# Patient Record
Sex: FEMALE | Race: ASIAN | NOT HISPANIC OR LATINO
[De-identification: names, ages, dates, MRNs, and addresses within clinical notes are randomized per-mention and may not be internally consistent; named-entity substitution may affect disease eponyms.]

---

## 2019-07-24 PROBLEM — Z00.00 ENCOUNTER FOR PREVENTIVE HEALTH EXAMINATION: Status: ACTIVE | Noted: 2019-07-24

## 2019-09-04 ENCOUNTER — APPOINTMENT (OUTPATIENT)
Dept: GASTROENTEROLOGY | Facility: CLINIC | Age: 24
End: 2019-09-04
Payer: COMMERCIAL

## 2019-09-04 VITALS
WEIGHT: 224 LBS | BODY MASS INDEX: 45.16 KG/M2 | SYSTOLIC BLOOD PRESSURE: 122 MMHG | HEIGHT: 59 IN | TEMPERATURE: 98.4 F | HEART RATE: 60 BPM | RESPIRATION RATE: 16 BRPM | OXYGEN SATURATION: 98 % | DIASTOLIC BLOOD PRESSURE: 78 MMHG

## 2019-09-04 DIAGNOSIS — R10.32 LEFT LOWER QUADRANT PAIN: ICD-10-CM

## 2019-09-04 DIAGNOSIS — K21.9 GASTRO-ESOPHAGEAL REFLUX DISEASE W/OUT ESOPHAGITIS: ICD-10-CM

## 2019-09-04 DIAGNOSIS — Z78.9 OTHER SPECIFIED HEALTH STATUS: ICD-10-CM

## 2019-09-04 DIAGNOSIS — Z84.1 FAMILY HISTORY OF DISORDERS OF KIDNEY AND URETER: ICD-10-CM

## 2019-09-04 DIAGNOSIS — E66.01 MORBID (SEVERE) OBESITY DUE TO EXCESS CALORIES: ICD-10-CM

## 2019-09-04 DIAGNOSIS — E28.2 POLYCYSTIC OVARIAN SYNDROME: ICD-10-CM

## 2019-09-04 PROCEDURE — 99204 OFFICE O/P NEW MOD 45 MIN: CPT

## 2019-09-04 RX ORDER — OMEPRAZOLE 40 MG/1
40 CAPSULE, DELAYED RELEASE ORAL
Qty: 1 | Refills: 3 | Status: ACTIVE | COMMUNITY
Start: 2019-09-04 | End: 1900-01-01

## 2019-09-04 RX ORDER — LINACLOTIDE 145 UG/1
145 CAPSULE, GELATIN COATED ORAL
Qty: 30 | Refills: 3 | Status: ACTIVE | COMMUNITY
Start: 2019-09-04 | End: 1900-01-01

## 2019-09-04 NOTE — PHYSICAL EXAM
[General Appearance - Alert] : alert [General Appearance - In No Acute Distress] : in no acute distress [General Appearance - Well Nourished] : well nourished [General Appearance - Well Developed] : well developed [Auscultation Breath Sounds / Voice Sounds] : lungs were clear to auscultation bilaterally [Heart Sounds] : normal S1 and S2 [Bowel Sounds] : normal bowel sounds [Abdomen Soft] : soft [Abdomen Tenderness] : non-tender [Abnormal Walk] : normal gait [Oriented To Time, Place, And Person] : oriented to person, place, and time [FreeTextEntry1] : obese [Skin Lesions] : no skin lesions [] : no rash

## 2019-09-04 NOTE — REVIEW OF SYSTEMS
[Fever] : no fever [Chills] : no chills [Eye Pain] : no eye pain [Red Eyes] : eyes not red [Chest Pain] : no chest pain [Palpitations] : no palpitations [Arthralgias] : no arthralgias [Dysuria] : no dysuria [Joint Pain] : no joint pain [Anxiety] : no anxiety [Depression] : no depression [Muscle Weakness] : no muscle weakness [Easy Bleeding] : no tendency for easy bleeding [Easy Bruising] : no tendency for easy bruising

## 2019-09-04 NOTE — ASSESSMENT
[FreeTextEntry1] : 1. Vague llq pain infrequent, with c/o incomplete evacuation of stools, ? IBS with constipation\par \par plan trial of linzess\par \par 2. GERD not weekly, no weight loss, no dysphagia\par \par plan;antireflux diet\par           ppi as needed\par \par 3. morbid obesity\par \par plan nutrition consult\par          exercise for weight loss.

## 2019-12-13 ENCOUNTER — APPOINTMENT (OUTPATIENT)
Dept: GASTROENTEROLOGY | Facility: CLINIC | Age: 24
End: 2019-12-13

## 2021-08-05 ENCOUNTER — NON-APPOINTMENT (OUTPATIENT)
Age: 26
End: 2021-08-05

## 2021-08-05 ENCOUNTER — APPOINTMENT (OUTPATIENT)
Dept: MATERNAL FETAL MEDICINE | Facility: CLINIC | Age: 26
End: 2021-08-05

## 2021-08-23 ENCOUNTER — APPOINTMENT (OUTPATIENT)
Dept: MATERNAL FETAL MEDICINE | Facility: CLINIC | Age: 26
End: 2021-08-23

## 2021-08-23 ENCOUNTER — TRANSCRIPTION ENCOUNTER (OUTPATIENT)
Age: 26
End: 2021-08-23

## 2021-08-23 ENCOUNTER — NON-APPOINTMENT (OUTPATIENT)
Age: 26
End: 2021-08-23

## 2021-10-08 ENCOUNTER — APPOINTMENT (OUTPATIENT)
Dept: ANTEPARTUM | Facility: CLINIC | Age: 26
End: 2021-10-08
Payer: COMMERCIAL

## 2021-10-08 ENCOUNTER — ASOB RESULT (OUTPATIENT)
Age: 26
End: 2021-10-08

## 2021-10-08 PROCEDURE — 76811 OB US DETAILED SNGL FETUS: CPT

## 2021-10-18 ENCOUNTER — ASOB RESULT (OUTPATIENT)
Age: 26
End: 2021-10-18

## 2021-10-18 ENCOUNTER — APPOINTMENT (OUTPATIENT)
Dept: ANTEPARTUM | Facility: CLINIC | Age: 26
End: 2021-10-18
Payer: COMMERCIAL

## 2021-10-18 PROCEDURE — 76816 OB US FOLLOW-UP PER FETUS: CPT

## 2022-02-24 ENCOUNTER — INPATIENT (INPATIENT)
Facility: HOSPITAL | Age: 27
LOS: 1 days | Discharge: HOME | End: 2022-02-26
Attending: OBSTETRICS & GYNECOLOGY | Admitting: OBSTETRICS & GYNECOLOGY
Payer: COMMERCIAL

## 2022-02-24 VITALS
SYSTOLIC BLOOD PRESSURE: 115 MMHG | HEART RATE: 70 BPM | RESPIRATION RATE: 17 BRPM | OXYGEN SATURATION: 99 % | TEMPERATURE: 98 F | DIASTOLIC BLOOD PRESSURE: 73 MMHG

## 2022-02-24 DIAGNOSIS — O26.899 OTHER SPECIFIED PREGNANCY RELATED CONDITIONS, UNSPECIFIED TRIMESTER: ICD-10-CM

## 2022-02-24 DIAGNOSIS — Z98.84 BARIATRIC SURGERY STATUS: Chronic | ICD-10-CM

## 2022-02-24 DIAGNOSIS — Z3A.00 WEEKS OF GESTATION OF PREGNANCY NOT SPECIFIED: ICD-10-CM

## 2022-02-24 DIAGNOSIS — Z34.80 ENCOUNTER FOR SUPERVISION OF OTHER NORMAL PREGNANCY, UNSPECIFIED TRIMESTER: ICD-10-CM

## 2022-02-24 LAB
BASOPHILS # BLD AUTO: 0.04 K/UL — SIGNIFICANT CHANGE UP (ref 0–0.2)
BASOPHILS NFR BLD AUTO: 0.4 % — SIGNIFICANT CHANGE UP (ref 0–2)
BLD GP AB SCN SERPL QL: NEGATIVE — SIGNIFICANT CHANGE UP
COVID-19 SPIKE DOMAIN AB INTERP: POSITIVE
COVID-19 SPIKE DOMAIN ANTIBODY RESULT: >250 U/ML — HIGH
EOSINOPHIL # BLD AUTO: 0.04 K/UL — SIGNIFICANT CHANGE UP (ref 0–0.5)
EOSINOPHIL NFR BLD AUTO: 0.4 % — SIGNIFICANT CHANGE UP (ref 0–6)
HCT VFR BLD CALC: 27.4 % — LOW (ref 34.5–45)
HCT VFR BLD CALC: 35.8 % — SIGNIFICANT CHANGE UP (ref 34.5–45)
HGB BLD-MCNC: 10.9 G/DL — LOW (ref 11.5–15.5)
HGB BLD-MCNC: 8.6 G/DL — LOW (ref 11.5–15.5)
IMM GRANULOCYTES NFR BLD AUTO: 0.3 % — SIGNIFICANT CHANGE UP (ref 0–1.5)
LYMPHOCYTES # BLD AUTO: 2.54 K/UL — SIGNIFICANT CHANGE UP (ref 1–3.3)
LYMPHOCYTES # BLD AUTO: 26.1 % — SIGNIFICANT CHANGE UP (ref 13–44)
MCHC RBC-ENTMCNC: 26.8 PG — LOW (ref 27–34)
MCHC RBC-ENTMCNC: 27.9 PG — SIGNIFICANT CHANGE UP (ref 27–34)
MCHC RBC-ENTMCNC: 30.4 GM/DL — LOW (ref 32–36)
MCHC RBC-ENTMCNC: 31.4 GM/DL — LOW (ref 32–36)
MCV RBC AUTO: 88 FL — SIGNIFICANT CHANGE UP (ref 80–100)
MCV RBC AUTO: 89 FL — SIGNIFICANT CHANGE UP (ref 80–100)
MONOCYTES # BLD AUTO: 0.61 K/UL — SIGNIFICANT CHANGE UP (ref 0–0.9)
MONOCYTES NFR BLD AUTO: 6.3 % — SIGNIFICANT CHANGE UP (ref 2–14)
NEUTROPHILS # BLD AUTO: 6.47 K/UL — SIGNIFICANT CHANGE UP (ref 1.8–7.4)
NEUTROPHILS NFR BLD AUTO: 66.5 % — SIGNIFICANT CHANGE UP (ref 43–77)
NRBC # BLD: 0 /100 WBCS — SIGNIFICANT CHANGE UP (ref 0–0)
NRBC # BLD: 0 /100 WBCS — SIGNIFICANT CHANGE UP (ref 0–0)
PLATELET # BLD AUTO: 218 K/UL — SIGNIFICANT CHANGE UP (ref 150–400)
PLATELET # BLD AUTO: 221 K/UL — SIGNIFICANT CHANGE UP (ref 150–400)
RBC # BLD: 3.08 M/UL — LOW (ref 3.8–5.2)
RBC # BLD: 4.07 M/UL — SIGNIFICANT CHANGE UP (ref 3.8–5.2)
RBC # FLD: 13.2 % — SIGNIFICANT CHANGE UP (ref 10.3–14.5)
RBC # FLD: 13.3 % — SIGNIFICANT CHANGE UP (ref 10.3–14.5)
RH IG SCN BLD-IMP: POSITIVE — SIGNIFICANT CHANGE UP
RH IG SCN BLD-IMP: POSITIVE — SIGNIFICANT CHANGE UP
SARS-COV-2 IGG+IGM SERPL QL IA: >250 U/ML — HIGH
SARS-COV-2 IGG+IGM SERPL QL IA: POSITIVE
SARS-COV-2 RNA SPEC QL NAA+PROBE: SIGNIFICANT CHANGE UP
WBC # BLD: 15.23 K/UL — HIGH (ref 3.8–10.5)
WBC # BLD: 9.73 K/UL — SIGNIFICANT CHANGE UP (ref 3.8–10.5)
WBC # FLD AUTO: 15.23 K/UL — HIGH (ref 3.8–10.5)
WBC # FLD AUTO: 9.73 K/UL — SIGNIFICANT CHANGE UP (ref 3.8–10.5)

## 2022-02-24 PROCEDURE — 93010 ELECTROCARDIOGRAM REPORT: CPT

## 2022-02-24 RX ORDER — OXYCODONE HYDROCHLORIDE 5 MG/1
5 TABLET ORAL
Refills: 0 | Status: DISCONTINUED | OUTPATIENT
Start: 2022-02-24 | End: 2022-02-26

## 2022-02-24 RX ORDER — SODIUM CHLORIDE 9 MG/ML
1000 INJECTION, SOLUTION INTRAVENOUS
Refills: 0 | Status: DISCONTINUED | OUTPATIENT
Start: 2022-02-24 | End: 2022-02-25

## 2022-02-24 RX ORDER — ONDANSETRON 8 MG/1
4 TABLET, FILM COATED ORAL ONCE
Refills: 0 | Status: DISCONTINUED | OUTPATIENT
Start: 2022-02-24 | End: 2022-02-26

## 2022-02-24 RX ORDER — SODIUM CHLORIDE 9 MG/ML
300 INJECTION INTRAMUSCULAR; INTRAVENOUS; SUBCUTANEOUS ONCE
Refills: 0 | Status: DISCONTINUED | OUTPATIENT
Start: 2022-02-24 | End: 2022-02-26

## 2022-02-24 RX ORDER — KETOROLAC TROMETHAMINE 30 MG/ML
30 SYRINGE (ML) INJECTION ONCE
Refills: 0 | Status: DISCONTINUED | OUTPATIENT
Start: 2022-02-24 | End: 2022-02-24

## 2022-02-24 RX ORDER — CITRIC ACID/SODIUM CITRATE 300-500 MG
15 SOLUTION, ORAL ORAL EVERY 6 HOURS
Refills: 0 | Status: DISCONTINUED | OUTPATIENT
Start: 2022-02-24 | End: 2022-02-25

## 2022-02-24 RX ORDER — SODIUM CHLORIDE 9 MG/ML
1000 INJECTION INTRAMUSCULAR; INTRAVENOUS; SUBCUTANEOUS
Refills: 0 | Status: DISCONTINUED | OUTPATIENT
Start: 2022-02-24 | End: 2022-02-26

## 2022-02-24 RX ORDER — SODIUM CHLORIDE 9 MG/ML
1000 INJECTION, SOLUTION INTRAVENOUS ONCE
Refills: 0 | Status: COMPLETED | OUTPATIENT
Start: 2022-02-24 | End: 2022-02-24

## 2022-02-24 RX ORDER — OXYCODONE HYDROCHLORIDE 5 MG/1
5 TABLET ORAL ONCE
Refills: 0 | Status: DISCONTINUED | OUTPATIENT
Start: 2022-02-24 | End: 2022-02-26

## 2022-02-24 RX ORDER — ACETAMINOPHEN 500 MG
975 TABLET ORAL
Refills: 0 | Status: DISCONTINUED | OUTPATIENT
Start: 2022-02-24 | End: 2022-02-26

## 2022-02-24 RX ORDER — OXYTOCIN 10 UNIT/ML
4 VIAL (ML) INJECTION
Qty: 30 | Refills: 0 | Status: DISCONTINUED | OUTPATIENT
Start: 2022-02-24 | End: 2022-02-25

## 2022-02-24 RX ORDER — CEFAZOLIN SODIUM 1 G
2000 VIAL (EA) INJECTION ONCE
Refills: 0 | Status: COMPLETED | OUTPATIENT
Start: 2022-02-24 | End: 2022-02-24

## 2022-02-24 RX ORDER — OXYTOCIN 10 UNIT/ML
333.33 VIAL (ML) INJECTION
Qty: 20 | Refills: 0 | Status: COMPLETED | OUTPATIENT
Start: 2022-02-24 | End: 2022-02-24

## 2022-02-24 RX ORDER — IBUPROFEN 200 MG
600 TABLET ORAL EVERY 6 HOURS
Refills: 0 | Status: COMPLETED | OUTPATIENT
Start: 2022-02-24 | End: 2023-01-23

## 2022-02-24 RX ADMIN — Medication 100 MILLIGRAM(S): at 22:04

## 2022-02-24 RX ADMIN — Medication 1000 MILLIUNIT(S)/MIN: at 20:32

## 2022-02-24 RX ADMIN — Medication 30 MILLIGRAM(S): at 23:04

## 2022-02-24 RX ADMIN — Medication 4 MILLIUNIT(S)/MIN: at 12:20

## 2022-02-24 RX ADMIN — SODIUM CHLORIDE 1000 MILLILITER(S): 9 INJECTION, SOLUTION INTRAVENOUS at 22:04

## 2022-02-24 NOTE — OB RN PATIENT PROFILE - NS_DATEOFLASTVISIT_OBGYN_ALL_OB_DT
Therapist assisted pt in creating a Safety Plan. Pt able to identify adaptive coping skills and distracting activities to help keep self safe post discharge. Plan also includes supportive resources. Safety plan on AVS-see AVS for details.  Also, conducted motivational interview and completed AODA Recovery plan with pt. See AVS for details. Daisy Canales LCSW, SAC   23-Feb-2022

## 2022-02-24 NOTE — OB RN PATIENT PROFILE - FALL HARM RISK - RISK INTERVENTIONS

## 2022-02-24 NOTE — OB RN DELIVERY SUMMARY - NS_SEPSISRSKCALC_OBGYN_ALL_OB_FT
EOS calculated successfully. EOS Risk Factor: 0.5/1000 live births ([facility] national incidence); GA=39w3d; Temp=98.24; ROM=4.9; GBS='Negative'; Antibiotics='No antibiotics or any antibiotics < 2 hrs prior to birth'

## 2022-02-24 NOTE — OB PROVIDER DELIVERY SUMMARY - NSSELHIDDEN_OBGYN_ALL_OB_FT
[NS_DeliveryAttending1_OBGYN_ALL_OB_FT:[id number]=],[NS_DeliveryRN_OBGYN_ALL_OB_FT:[id number]=]

## 2022-02-24 NOTE — PROVIDER CONTACT NOTE (OTHER) - BACKGROUND
27 year old female  39w 3d s/p  @ 20:16 EBL 600cc s/p IM Pitocin, TN Cytotec, IM Hemabate, TXA, and lomotil

## 2022-02-24 NOTE — OB RN DELIVERY SUMMARY - NSSELHIDDEN_OBGYN_ALL_OB_FT
[NS_DeliveryAttending1_OBGYN_ALL_OB_FT:[id number]=],[NS_DeliveryRN_OBGYN_ALL_OB_FT:[id number]=]

## 2022-02-24 NOTE — OB PROVIDER H&P - ASSESSMENT
26 yo P0 at 39w3d admitted in early labor. GBS neg    Plan  1. Admit to LND. Routine Labs. IVF.  2. IOL w pit  3. Fetus: cat 1 tracing. VTX. EFW 3200g by sono. Continuous EFM. Sono. No concerns.  4. Prenatal issues: none  5. GBS neg  6. Pain: IV pain meds/epidural PRN    Patient discussed with attending physician, Dr. Sanon.    Jen Ramirez MD PGY1

## 2022-02-24 NOTE — PROVIDER CONTACT NOTE (OTHER) - ASSESSMENT
pt with  bpm upon entering L&D PACU s/p . pt asymptomatic denies any palpitations, chest pain, dizziness, etc. BOBBY Yoon notified.

## 2022-02-24 NOTE — OB PROVIDER DELIVERY SUMMARY - NSPROVIDERDELIVERYNOTE_OBGYN_ALL_OB_FT
Spontaneous vaginal delivery of liveborn infant girl from OA position  Nuchal x 2  APGARS 8/9  Delayed cord clamping performed  Cord gases collected  Placenta delivered intact  Fundus atonic -- given IM pitocin, cytotec, hemabate, TXA  Tone improved  Intact cervix, vaginal walls and sulci  2nd deg lac repaired with 2.0 vicryl rapide  Excellent hemostasis  Count correct x 2    radha stanton

## 2022-02-24 NOTE — OB PROVIDER H&P - HISTORY OF PRESENT ILLNESS
Admission H&P    Subjective  HPI: 27 yoF  at 39w3d gestational age presents for contractions q4-5  mins. States that contractions started early thsi morning and were q4-5 mins but have spaced out since coming into triage. +FM. -LOF. -VB. Pt denies any other concerns.    – PNC: Denies prenatal issues. GBS neg.  EFW 3200g by ivonne.  – OBHx: P0  – GynHx: denies  – PMH: denies  – PSH: Gastric sleeve in   – Psych: denies   – Social: denies   – Meds: PNV   – Allergies: NKDA  – Will accept blood transfusions? Yes

## 2022-02-24 NOTE — PRE-ANESTHESIA EVALUATION ADULT - NSANTHOSAYNRD_GEN_A_CORE
No. LUIS ALFREDO screening performed.  STOP BANG Legend: 0-2 = LOW Risk; 3-4 = INTERMEDIATE Risk; 5-8 = HIGH Risk

## 2022-02-24 NOTE — OB PROVIDER LABOR PROGRESS NOTE - NS_SUBJECTIVE/OBJECTIVE_OBGYN_ALL_OB_FT
Pt examined for progress  Just received epidural
Pt examined for late decels
Pt examined for progress
pt examined for progress

## 2022-02-24 NOTE — OB PROVIDER LABOR PROGRESS NOTE - ASSESSMENT
IUPC placed  will do amnioinfusion  Pitocin currently at 2 because of cat 2 tracing -- not adequate contractions  Discussed that she has made minimal change over 6 hours and now cat 2 tracing. Discussed possibility of     radha stanton
labor down  start pushing in 30 min    radha stanton
start pitocin  Plan for AROM  peanut ball  epidural PRN    radha stanton
AROM clear  continue pitocin  peanut ball    radha stanton

## 2022-02-24 NOTE — OB PROVIDER H&P - NSHPPHYSICALEXAM_GEN_ALL_CORE
Objective  – Vital Signs  Vital Signs Last 24 Hrs  T(C): 36.6 (24 Feb 2022 08:32), Max: 36.6 (24 Feb 2022 08:32)  T(F): 97.9 (24 Feb 2022 08:32), Max: 97.9 (24 Feb 2022 08:32)  HR: 68 (24 Feb 2022 09:22) (67 - 118)  BP: 115/73 (24 Feb 2022 08:41) (115/73 - 115/73)  BP(mean): --  RR: 17 (24 Feb 2022 08:32) (17 - 17)  SpO2: 99% (24 Feb 2022 09:22) (91% - 100%)  – PE:   CV: RRR  Pulm: breathing comfortably on RA  Abd: gravid, nontender  Extr: moving all extremities with ease  – VE: 3.5/80/-3  – FHT: baseline 135, mod variability, +accels, -decels  – [place]: irregular  – EFW: 3200g by sono  – Sono: vertex

## 2022-02-24 NOTE — PROVIDER CONTACT NOTE (OTHER) - ACTION/TREATMENT ORDERED:
as per BOBBY Yoon obtain EKG, draw CBC, 1L IV fluid bolus of LR, and IVPB ancef 2g x1 dose as ordered. continue to monitor.

## 2022-02-25 LAB
HCT VFR BLD CALC: 22 % — LOW (ref 34.5–45)
HGB BLD-MCNC: 6.7 G/DL — CRITICAL LOW (ref 11.5–15.5)
MCHC RBC-ENTMCNC: 26.8 PG — LOW (ref 27–34)
MCHC RBC-ENTMCNC: 30.5 GM/DL — LOW (ref 32–36)
MCV RBC AUTO: 88 FL — SIGNIFICANT CHANGE UP (ref 80–100)
NRBC # BLD: 0 /100 WBCS — SIGNIFICANT CHANGE UP (ref 0–0)
PLATELET # BLD AUTO: 170 K/UL — SIGNIFICANT CHANGE UP (ref 150–400)
RBC # BLD: 2.5 M/UL — LOW (ref 3.8–5.2)
RBC # FLD: 13.4 % — SIGNIFICANT CHANGE UP (ref 10.3–14.5)
T PALLIDUM AB TITR SER: NEGATIVE — SIGNIFICANT CHANGE UP
WBC # BLD: 12.7 K/UL — HIGH (ref 3.8–10.5)
WBC # FLD AUTO: 12.7 K/UL — HIGH (ref 3.8–10.5)

## 2022-02-25 RX ORDER — ASCORBIC ACID 60 MG
500 TABLET,CHEWABLE ORAL
Refills: 0 | Status: DISCONTINUED | OUTPATIENT
Start: 2022-02-25 | End: 2022-02-26

## 2022-02-25 RX ORDER — IBUPROFEN 200 MG
600 TABLET ORAL EVERY 6 HOURS
Refills: 0 | Status: DISCONTINUED | OUTPATIENT
Start: 2022-02-25 | End: 2022-02-26

## 2022-02-25 RX ORDER — FERROUS SULFATE 325(65) MG
325 TABLET ORAL
Refills: 0 | Status: DISCONTINUED | OUTPATIENT
Start: 2022-02-25 | End: 2022-02-26

## 2022-02-25 RX ADMIN — Medication 975 MILLIGRAM(S): at 14:59

## 2022-02-25 RX ADMIN — Medication 975 MILLIGRAM(S): at 20:06

## 2022-02-25 RX ADMIN — Medication 975 MILLIGRAM(S): at 03:41

## 2022-02-25 RX ADMIN — Medication 500 MILLIGRAM(S): at 18:04

## 2022-02-25 RX ADMIN — Medication 600 MILLIGRAM(S): at 12:10

## 2022-02-25 RX ADMIN — Medication 975 MILLIGRAM(S): at 21:05

## 2022-02-25 RX ADMIN — Medication 975 MILLIGRAM(S): at 09:34

## 2022-02-25 RX ADMIN — Medication 325 MILLIGRAM(S): at 18:04

## 2022-02-25 RX ADMIN — OXYCODONE HYDROCHLORIDE 5 MILLIGRAM(S): 5 TABLET ORAL at 19:13

## 2022-02-25 RX ADMIN — Medication 600 MILLIGRAM(S): at 18:03

## 2022-02-25 RX ADMIN — Medication 600 MILLIGRAM(S): at 06:25

## 2022-02-25 RX ADMIN — Medication 600 MILLIGRAM(S): at 23:42

## 2022-02-25 NOTE — PROGRESS NOTE ADULT - PROBLEM SELECTOR PLAN 1
Increase OOB  Regular diet  PO Pain protocol  Routine Postpartum Care      Anabela Hughes PA-C Increase OOB  Regular diet  PO Pain protocol  iron/vit C ordered  Routine Postpartum Care      Anabela Hughes PA-C

## 2022-02-25 NOTE — PATIENT PROFILE ADULT - FALL HARM RISK - RISK INTERVENTIONS

## 2022-02-25 NOTE — PROGRESS NOTE ADULT - ASSESSMENT
A/P:  27y  PPD # 1      S/P                       doing well      Current Issues: none  PAST MEDICAL & SURGICAL HISTORY:  H/O bariatric surgery  gastric sleeve in  A/P:  27y  PPD # 1      S/P                       doing well      Current Issues: PPH , pt asymptomatic, VSS -  will repeat CBC this am  PAST MEDICAL & SURGICAL HISTORY:  H/O bariatric surgery  gastric sleeve in

## 2022-02-26 ENCOUNTER — TRANSCRIPTION ENCOUNTER (OUTPATIENT)
Age: 27
End: 2022-02-26

## 2022-02-26 VITALS
RESPIRATION RATE: 18 BRPM | HEART RATE: 68 BPM | DIASTOLIC BLOOD PRESSURE: 81 MMHG | TEMPERATURE: 98 F | SYSTOLIC BLOOD PRESSURE: 120 MMHG

## 2022-02-26 PROCEDURE — 86780 TREPONEMA PALLIDUM: CPT

## 2022-02-26 PROCEDURE — 86900 BLOOD TYPING SEROLOGIC ABO: CPT

## 2022-02-26 PROCEDURE — 59025 FETAL NON-STRESS TEST: CPT

## 2022-02-26 PROCEDURE — G0463: CPT

## 2022-02-26 PROCEDURE — 87635 SARS-COV-2 COVID-19 AMP PRB: CPT

## 2022-02-26 PROCEDURE — 85025 COMPLETE CBC W/AUTO DIFF WBC: CPT

## 2022-02-26 PROCEDURE — 85027 COMPLETE CBC AUTOMATED: CPT

## 2022-02-26 PROCEDURE — 59050 FETAL MONITOR W/REPORT: CPT

## 2022-02-26 PROCEDURE — 86769 SARS-COV-2 COVID-19 ANTIBODY: CPT

## 2022-02-26 PROCEDURE — 36415 COLL VENOUS BLD VENIPUNCTURE: CPT

## 2022-02-26 PROCEDURE — 93005 ELECTROCARDIOGRAM TRACING: CPT

## 2022-02-26 PROCEDURE — 86850 RBC ANTIBODY SCREEN: CPT

## 2022-02-26 PROCEDURE — 86901 BLOOD TYPING SEROLOGIC RH(D): CPT

## 2022-02-26 RX ADMIN — Medication 600 MILLIGRAM(S): at 13:00

## 2022-02-26 RX ADMIN — Medication 500 MILLIGRAM(S): at 05:53

## 2022-02-26 RX ADMIN — Medication 325 MILLIGRAM(S): at 05:53

## 2022-02-26 RX ADMIN — Medication 600 MILLIGRAM(S): at 00:30

## 2022-02-26 RX ADMIN — Medication 600 MILLIGRAM(S): at 05:52

## 2022-02-26 RX ADMIN — Medication 600 MILLIGRAM(S): at 06:30

## 2022-02-26 NOTE — DISCHARGE NOTE OB - PATIENT PORTAL LINK FT
You can access the FollowMyHealth Patient Portal offered by [facility] by registering at the following website: [email or web address]. By joining [employer]’s FollowMyHealth portal, you will also be able to view your health information using other applications (apps) compatible with our system.

## 2022-02-26 NOTE — PROGRESS NOTE ADULT - SUBJECTIVE AND OBJECTIVE BOX
S: Patient doing well. No complaints. Minimal lochia. Pain controlled.    O: Vital Signs Last 24 Hrs  T(C): 36.7 (2022 05:00), Max: 37 (2022 13:04)  T(F): 98.1 (2022 05:00), Max: 98.6 (2022 13:04)  HR: 74 (2022 05:00) (74 - 97)  BP: 133/90 (2022 05:00) (96/65 - 133/90)  BP(mean): --  RR: 17 (2022 05:00) (17 - 18)  SpO2: 97% (2022 05:00) (97% - 98%)    Gen: NAD  Abd: soft, Nontender, Nondistended, fundus firm  Ext: no tenderness, mild edema    Labs:                        6.7    12.70 )-----------( 170      ( 2022 08:10 )             22.0       A: 27y PPD#2 s/p  doing well.    Plan:  Routine postpartum care  Encouraged out of bed  Regular diet    Discharge  STEPHON Li MD
Postpartum Note- PPD#1    Allergies    No Known Allergies    Intolerances    Blood Type O   Positive    RPR : Negative    Rubella: Immune    S:Patient is a  27y      P1      PPD#1         S/P     Patient w/o complaints, pain is controlled.    Pt is OOB, tolerating PO, passing flatus. Lochia WNL.     Feeding: Breast    O:  Vital Signs Last 24 Hrs  T(C): 36.9 (2022 05:00), Max: 37.3 (2022 23:00)  T(F): 98.5 (2022 05:00), Max: 99.1 (2022 23:00)  HR: 84 (2022 05:00) (51 - 178)  BP: 106/71 (2022 05:00) (99/67 - 143/103)  BP(mean): 85 (2022 00:00) (76 - 86)  RR: 17 (2022 05:00) (16 - 18)  SpO2: 98% (2022 05:00) (74% - 100%)     Gen: NAD  Abdomen: Soft, nontender, non-distended, fundus firm.  Vaginal: Lochia WNL  Ext: Neg calf tenderness    LABS:    Hemoglobin: 8.6 g/dL ( @ 23:05)  Hemoglobin: 10.9 g/dL ( @ 11:51)      Hematocrit: 27.4 % ( @ 23:05)  Hematocrit: 35.8 % ( @ 11:51)

## 2022-02-26 NOTE — DISCHARGE NOTE OB - MATERIALS PROVIDED
Vaccinations/[place]  Screening Program/Breastfeeding Log/Guide to Postpartum Care/Back To Sleep Handout/Breastfeeding Guide and Packet

## 2022-02-26 NOTE — DISCHARGE NOTE OB - NS MD DC FALL RISK RISK
For information on Fall & Injury Prevention, visit: [email or web address] OR  [email or web address] OR  https://www.cdc.gov/steadi/patient.html

## 2022-03-01 NOTE — OB RN TRIAGE NOTE - FALL HARM RISK - RISK INTERVENTIONS
Communicate Fall Risk and Risk Factors to all staff, patient, and family/Reinforce activity limits and safety measures with patient and family/Visual Cue: Yellow wristband/Bed in lowest position, wheels locked, appropriate side rails in place/Call bell, personal items and telephone in reach/Instruct patient to call for assistance before getting out of bed or chair/Non-slip footwear when patient is out of bed/Continental to call system/Physically safe environment - no spills, clutter or unnecessary equipment/Purposeful Proactive Rounding/Room/bathroom lighting operational, light cord in reach 3 = A little assistance

## 2022-03-05 ENCOUNTER — EMERGENCY (EMERGENCY)
Facility: HOSPITAL | Age: 27
LOS: 1 days | Discharge: HOME | End: 2022-03-05
Attending: EMERGENCY MEDICINE
Payer: COMMERCIAL

## 2022-03-05 VITALS
RESPIRATION RATE: 19 BRPM | OXYGEN SATURATION: 99 % | TEMPERATURE: 98 F | SYSTOLIC BLOOD PRESSURE: 107 MMHG | DIASTOLIC BLOOD PRESSURE: 81 MMHG | HEART RATE: 90 BPM

## 2022-03-05 VITALS
WEIGHT: 186.07 LBS | DIASTOLIC BLOOD PRESSURE: 84 MMHG | SYSTOLIC BLOOD PRESSURE: 126 MMHG | HEIGHT: 59 IN | HEART RATE: 88 BPM | RESPIRATION RATE: 18 BRPM | TEMPERATURE: 98 F | OXYGEN SATURATION: 99 %

## 2022-03-05 DIAGNOSIS — Z98.84 BARIATRIC SURGERY STATUS: Chronic | ICD-10-CM

## 2022-03-05 LAB
ALBUMIN SERPL ELPH-MCNC: 3.2 G/DL — LOW (ref 3.3–5)
ALP SERPL-CCNC: 103 U/L — SIGNIFICANT CHANGE UP (ref 40–120)
ALT FLD-CCNC: 12 U/L — SIGNIFICANT CHANGE UP (ref 10–45)
ANION GAP SERPL CALC-SCNC: 12 MMOL/L — SIGNIFICANT CHANGE UP (ref 5–17)
APPEARANCE UR: CLEAR — SIGNIFICANT CHANGE UP
AST SERPL-CCNC: 23 U/L — SIGNIFICANT CHANGE UP (ref 10–40)
BACTERIA # UR AUTO: NEGATIVE — SIGNIFICANT CHANGE UP
BASOPHILS # BLD AUTO: 0.03 K/UL — SIGNIFICANT CHANGE UP (ref 0–0.2)
BASOPHILS NFR BLD AUTO: 0.4 % — SIGNIFICANT CHANGE UP (ref 0–2)
BILIRUB SERPL-MCNC: 0.1 MG/DL — LOW (ref 0.2–1.2)
BILIRUB UR-MCNC: NEGATIVE — SIGNIFICANT CHANGE UP
BUN SERPL-MCNC: 8 MG/DL — SIGNIFICANT CHANGE UP (ref 7–23)
CALCIUM SERPL-MCNC: 8.7 MG/DL — SIGNIFICANT CHANGE UP (ref 8.4–10.5)
CHLORIDE SERPL-SCNC: 108 MMOL/L — SIGNIFICANT CHANGE UP (ref 96–108)
CO2 SERPL-SCNC: 21 MMOL/L — LOW (ref 22–31)
COLOR SPEC: SIGNIFICANT CHANGE UP
CREAT SERPL-MCNC: 0.77 MG/DL — SIGNIFICANT CHANGE UP (ref 0.5–1.3)
DIFF PNL FLD: ABNORMAL
EGFR: 108 ML/MIN/1.73M2 — SIGNIFICANT CHANGE UP
EOSINOPHIL # BLD AUTO: 0.1 K/UL — SIGNIFICANT CHANGE UP (ref 0–0.5)
EOSINOPHIL NFR BLD AUTO: 1.3 % — SIGNIFICANT CHANGE UP (ref 0–6)
EPI CELLS # UR: 1 /HPF — SIGNIFICANT CHANGE UP
GLUCOSE SERPL-MCNC: 81 MG/DL — SIGNIFICANT CHANGE UP (ref 70–99)
GLUCOSE UR QL: NEGATIVE — SIGNIFICANT CHANGE UP
HCT VFR BLD CALC: 24.9 % — LOW (ref 34.5–45)
HGB BLD-MCNC: 7.4 G/DL — LOW (ref 11.5–15.5)
HYALINE CASTS # UR AUTO: 3 /LPF — HIGH (ref 0–2)
IMM GRANULOCYTES NFR BLD AUTO: 0.8 % — SIGNIFICANT CHANGE UP (ref 0–1.5)
KETONES UR-MCNC: SIGNIFICANT CHANGE UP
LEUKOCYTE ESTERASE UR-ACNC: ABNORMAL
LYMPHOCYTES # BLD AUTO: 2.09 K/UL — SIGNIFICANT CHANGE UP (ref 1–3.3)
LYMPHOCYTES # BLD AUTO: 27.3 % — SIGNIFICANT CHANGE UP (ref 13–44)
MCHC RBC-ENTMCNC: 27.2 PG — SIGNIFICANT CHANGE UP (ref 27–34)
MCHC RBC-ENTMCNC: 29.7 GM/DL — LOW (ref 32–36)
MCV RBC AUTO: 91.5 FL — SIGNIFICANT CHANGE UP (ref 80–100)
MONOCYTES # BLD AUTO: 0.17 K/UL — SIGNIFICANT CHANGE UP (ref 0–0.9)
MONOCYTES NFR BLD AUTO: 2.2 % — SIGNIFICANT CHANGE UP (ref 2–14)
NEUTROPHILS # BLD AUTO: 5.2 K/UL — SIGNIFICANT CHANGE UP (ref 1.8–7.4)
NEUTROPHILS NFR BLD AUTO: 68 % — SIGNIFICANT CHANGE UP (ref 43–77)
NITRITE UR-MCNC: NEGATIVE — SIGNIFICANT CHANGE UP
NRBC # BLD: 0 /100 WBCS — SIGNIFICANT CHANGE UP (ref 0–0)
PH UR: 6 — SIGNIFICANT CHANGE UP (ref 5–8)
PLATELET # BLD AUTO: 432 K/UL — HIGH (ref 150–400)
POTASSIUM SERPL-MCNC: 4.2 MMOL/L — SIGNIFICANT CHANGE UP (ref 3.5–5.3)
POTASSIUM SERPL-SCNC: 4.2 MMOL/L — SIGNIFICANT CHANGE UP (ref 3.5–5.3)
PROT SERPL-MCNC: 7 G/DL — SIGNIFICANT CHANGE UP (ref 6–8.3)
PROT UR-MCNC: SIGNIFICANT CHANGE UP
RBC # BLD: 2.72 M/UL — LOW (ref 3.8–5.2)
RBC # FLD: 14.7 % — HIGH (ref 10.3–14.5)
RBC CASTS # UR COMP ASSIST: 17 /HPF — HIGH (ref 0–4)
SARS-COV-2 RNA SPEC QL NAA+PROBE: SIGNIFICANT CHANGE UP
SODIUM SERPL-SCNC: 141 MMOL/L — SIGNIFICANT CHANGE UP (ref 135–145)
SP GR SPEC: 1.02 — SIGNIFICANT CHANGE UP (ref 1.01–1.02)
UROBILINOGEN FLD QL: NEGATIVE — SIGNIFICANT CHANGE UP
WBC # BLD: 7.65 K/UL — SIGNIFICANT CHANGE UP (ref 3.8–10.5)
WBC # FLD AUTO: 7.65 K/UL — SIGNIFICANT CHANGE UP (ref 3.8–10.5)
WBC UR QL: 49 /HPF — HIGH (ref 0–5)

## 2022-03-05 PROCEDURE — 93976 VASCULAR STUDY: CPT | Mod: 26,59

## 2022-03-05 PROCEDURE — 99284 EMERGENCY DEPT VISIT MOD MDM: CPT | Mod: 25

## 2022-03-05 PROCEDURE — 81001 URINALYSIS AUTO W/SCOPE: CPT

## 2022-03-05 PROCEDURE — 36415 COLL VENOUS BLD VENIPUNCTURE: CPT

## 2022-03-05 PROCEDURE — 76856 US EXAM PELVIC COMPLETE: CPT | Mod: 26,59

## 2022-03-05 PROCEDURE — 76856 US EXAM PELVIC COMPLETE: CPT

## 2022-03-05 PROCEDURE — 93975 VASCULAR STUDY: CPT

## 2022-03-05 PROCEDURE — 99285 EMERGENCY DEPT VISIT HI MDM: CPT

## 2022-03-05 PROCEDURE — U0003: CPT

## 2022-03-05 PROCEDURE — 80053 COMPREHEN METABOLIC PANEL: CPT

## 2022-03-05 PROCEDURE — 85025 COMPLETE CBC W/AUTO DIFF WBC: CPT

## 2022-03-05 PROCEDURE — U0005: CPT

## 2022-03-05 RX ORDER — SODIUM CHLORIDE 9 MG/ML
1000 INJECTION INTRAMUSCULAR; INTRAVENOUS; SUBCUTANEOUS ONCE
Refills: 0 | Status: COMPLETED | OUTPATIENT
Start: 2022-03-05 | End: 2022-03-05

## 2022-03-05 RX ORDER — ACETAMINOPHEN 500 MG
650 TABLET ORAL ONCE
Refills: 0 | Status: COMPLETED | OUTPATIENT
Start: 2022-03-05 | End: 2022-03-05

## 2022-03-05 RX ADMIN — Medication 650 MILLIGRAM(S): at 08:18

## 2022-03-05 RX ADMIN — SODIUM CHLORIDE 1000 MILLILITER(S): 9 INJECTION INTRAMUSCULAR; INTRAVENOUS; SUBCUTANEOUS at 08:18

## 2022-03-05 NOTE — ED ADULT NURSE NOTE - OBJECTIVE STATEMENT
pt c/o "sudden onset of pain to suprapubic area more middle/left that went around to back after urinating this am about 0530. took some Motrin with some relief. No n/v/d/urinary S&S, last BM yesterday and was normal. Vaginal delivery on 22 () had some excessive bleeding that they had to put some stitches in."

## 2022-03-05 NOTE — CONSULT NOTE ADULT - ATTENDING COMMENTS
28y/o  PPD#9 s/p  c/b uterine atony presenting to the ED complaining of abdominal pain. Patient in stable condition, VSS, TVUS with normal findings, postpartum uterus  WBC ct wnl, afebrile.  - No acute OB intervention  - s/p Tylenol, can take up to 600mg Ibuprofen every 6 hours and/or tylenol 975mg every 6 hours   - Pt to f/u in office next week w/ private OB    Christina Rueda  Ob attg

## 2022-03-05 NOTE — ED PROVIDER NOTE - PATIENT PORTAL LINK FT
You can access the FollowMyHealth Patient Portal offered by [facility] by registering at the following website: [email or web address]. By joining [employer]’s FollowMyHealth portal, you will also be able to view your health information using other applications (apps) compatible with our system.

## 2022-03-05 NOTE — ED PROVIDER NOTE - ATTENDING CONTRIBUTION TO CARE
post partum abdominal pain w slight inc bleeding / low grade temp  minimally tender abdomen  sono and dw obgyn, serial exams, repeated vs. re-assess.

## 2022-03-05 NOTE — ED PROVIDER NOTE - CLINICAL SUMMARY MEDICAL DECISION MAKING FREE TEXT BOX
Deanna, PGY2. 27F PCOS,  vaginal birth 22 who presents with LLQ abd pain. Fever 2 days ago T 102. Exam as above.  DDx: ovarian cyst rupture vs. postpartum infectious complication e.g. endometritis vs. constipation.  Rectal temp, labs, transabd US, ob/gyn consult (Dr. Li's pt), pain control.

## 2022-03-05 NOTE — ED PROVIDER NOTE - NS ED ROS FT
General: + fever.  HEENT: No headaches. + toothache  CVS: No chest pain, palpitations  Resp: No cough, dyspnea, wheezing  GI: +LLQ abd pain. No nausea, vomiting, diarrhea  : +increased vaginal bleed. No dysuria  MSK: No joint pain or swelling  Ext: No edema, no claudication  Skin: No rashes or itching  Heme: No easy bruising or petechiae  Neuro: No confusion, loss of consciousness  Endocrine: No excessive heat or cold symptoms

## 2022-03-05 NOTE — ED ADULT NURSE REASSESSMENT NOTE - NS ED NURSE REASSESS COMMENT FT1
No rigor ing noted. Spouse at bedside.  Temp 09.2 Pt states" I feel better." Denies pain at this time U/A to lab.

## 2022-03-05 NOTE — ED PROVIDER NOTE - PROGRESS NOTE DETAILS
Rectal temp 99.9 but rigors.  Was given tylenol for pain.  Labs wnl, pelvic us normal. Final gyn recs pending. Rectal temp 99.9 but rigors.  Was given tylenol for pain.  Labs wnl, pelvic us shows enlarged postpartum uterus and small amt of air in endometrial cavity. Final gyn recs pending. Discussed results with Gyn, no further workup or treatment recommended, recommends discharge home with 1 week follow up.     UA +LE, WBC, no bacteria. No urinary symptoms.

## 2022-03-05 NOTE — ED PROVIDER NOTE - OBJECTIVE STATEMENT
27F , hx of PCOS, 27F , hx of PCOS, gastric sleeve who presents with sudden onset LLQ pain at 530am.  Sharp in nature, 10/10, took 2 motrin and warm compress with mild relief.  Currently 5/10. Reports no complications during pregnancy, vaginal birth at 39weeks, s/p stitches, and no postpartum complications.  + increased vaginal bleeding in last 2 days.  + constipation since taking iron pills post-partum.  + fever 2 days ago T102 orally, however also has been having toothache and went to dentist.

## 2022-03-05 NOTE — CONSULT NOTE ADULT - SUBJECTIVE AND OBJECTIVE BOX
Gyn Consult Note  VIC PALACIOONANICO  27y  Female [id number]    HPI: 28y/o  PPD#9 s/p  c/b uterine atony presenting to the ED complaining of abdominal pain. Patient reports abdominal pain starting this morning. She reports pain started acutely this morning after coming back from the bathroom. She denies feeling pain similar to this prior. Lochia has been stable. She took Motrin for the pain and had relief with a hot pack. She denies lightheadedness, dizziness, HA, CP, palpitations, N/V, urinary symptoms, and changes in bowel habits.    Name of GYN Physician: Dr. Li    – PNC: Denies prenatal issues.   – OBHx:   – GynHx: Denies hx of fibroids/STDs/abnormal pap smears/ovarian cysts/PCOS   – PMH: denies  – PSH: Gastric sleeve (chandana-en-Y in )  – Psych: denies   – Social: denies   – Meds: PNV   – Allergies: NKDA  – Will accept blood transfusions? Yes      Vital Signs Last 24 Hrs  T(C): 37.7 (05 Mar 2022 08:17), Max: 37.7 (05 Mar 2022 08:17)  T(F): 99.9 (05 Mar 2022 08:17), Max: 99.9 (05 Mar 2022 08:17)  HR: 88 (05 Mar 2022 06:36) (88 - 88)  BP: 126/84 (05 Mar 2022 06:36) (126/84 - 126/84)  BP(mean): --  RR: 18 (05 Mar 2022 06:36) (18 - 18)  SpO2: 99% (05 Mar 2022 06:36) (99% - 99%)    Physical Exam:   General: sitting comfortably in bed, NAD   CV: RRR  Lungs: CTAB  Abd: Soft, mild lower abdomen tenderness, non-distended.  Bowel sounds present.    Ext: non-tender b/l, no edema     LABS:             7.4    7.65  )-----------( 432      ( 05 Mar 2022 08:10 )             24.9     03-05  141  |  108  |  8   ----------------------------<  81  4.2   |  21<L>  |  0.77    Ca    8.7      05 Mar 2022 08:10    TPro  7.0  /  Alb  3.2<L>  /  TBili  0.1<L>  /  DBili  x   /  AST  23  /  ALT  12  /  AlkPhos  103  03-05      RADIOLOGY & ADDITIONAL STUDIES:  *abdominal US pending* Gyn Consult Note  VIC BELCHER  27y  Female [id number]    HPI: 28y/o  PPD#9 s/p  c/b uterine atony presenting to the ED complaining of abdominal pain. Patient reports abdominal pain starting this morning. She reports pain started acutely this morning after coming back from the bathroom. She denies feeling pain similar to this prior. Lochia has been stable. She took Motrin for the pain and had relief with a hot pack. She denies lightheadedness, dizziness, HA, CP, palpitations, N/V, urinary symptoms, and changes in bowel habits.    Name of GYN Physician: Dr. Li    – PNC: Denies prenatal issues.   OBHx:   -  x1  GynHx: Denies hx of fibroids/STDs/abnormal pap smears/ovarian cysts/PCOS   PMH: denies  PSH: Gastric sleeve (chandana-en-Y in )  Psych: denies   Social: denies   Meds: PNV   Allergies: NKDA      Vital Signs Last 24 Hrs  T(C): 37.7 (05 Mar 2022 08:17), Max: 37.7 (05 Mar 2022 08:17)  T(F): 99.9 (05 Mar 2022 08:17), Max: 99.9 (05 Mar 2022 08:17)  HR: 88 (05 Mar 2022 06:36) (88 - 88)  BP: 126/84 (05 Mar 2022 06:36) (126/84 - 126/84)  BP(mean): --  RR: 18 (05 Mar 2022 06:36) (18 - 18)  SpO2: 99% (05 Mar 2022 06:36) (99% - 99%)    Physical Exam:   General: sitting comfortably in bed, NAD   CV: RRR  Lungs: CTAB  Abd: Soft, mild LLQ abdomen tenderness, no rebound/guaridng, non-distended.  Bowel sounds present.    Ext: non-tender b/l, no edema     LABS:             7.4    7.65  )-----------( 432      ( 05 Mar 2022 08:10 )             24.9     03-05  141  |  108  |  8   ----------------------------<  81  4.2   |  21<L>  |  0.77    Ca    8.7      05 Mar 2022 08:10    TPro  7.0  /  Alb  3.2<L>  /  TBili  0.1<L>  /  DBili  x   /  AST  23  /  ALT  12  /  AlkPhos  103  -      RADIOLOGY & ADDITIONAL STUDIES:    < from: US Doppler Pelvis (22 @ 09:26) >  ACC: [id number] EXAM:  US DPLX PELVIC                        ACC: [id number] EXAM:  US PELVIC COMPLETE                        PROCEDURE DATE:  2022    INTERPRETATION:  CLINICAL INFORMATION: 27-year-old female with abdominal   pain. History of gastric sleeve cyst. Status post  2022.   Increased vaginal bleeding for 2 days.  COMPARISON: None available.  TECHNIQUE:  Endovaginal and transabdominal pelvic sonogram.  FINDINGS:  Uterus: Enlarged postpartum uterus  Endometrium: 9 mm. Small amount of air in endometrial cavity.  Right ovary: 3.4 x 1.8 x 3.2 cm Within normal limits.  Left ovary: 3.4 cm x 1.7 cm x 2.0 cm. Within normal limits.  Fluid: None.  IMPRESSION:  Enlarged postpartum uterus.  --- End of Report ---  CHELSI BROWNLEE MD; Attending Radiologist  This document has been electronically signed. Mar  5 2022  9:37AM  < end of copied text >

## 2022-03-05 NOTE — ED PROVIDER NOTE - CARE PROVIDER_API CALL
Ignacio Li)  Obstetrics and Gynecology  [address] [place] [address], [address]  [place] [address]  Phone: [phone]  Fax: [phone]  Established Patient  Follow Up Time:

## 2022-03-05 NOTE — ED PROVIDER NOTE - NSFOLLOWUPINSTRUCTIONS_ED_ALL_ED_FT
You came to the hospital for abdominal pain.  Your labs and pelvic ultrasound showed no significant abnormal findings.  Gynecology evaluated you in the hospital and recommended discharge home with follow up in 1 week.  Please follow up with Dr. Li in 1 week.  If you have any fevers or significant abdominal pain, please return to the hospital immediately.

## 2022-03-05 NOTE — CONSULT NOTE ADULT - ASSESSMENT
28y/o  PPD#9 s/p  c/b uterine atony presenting to the ED complaining of abdominal pain.   - f/u pelvic US  - s/p Tylenol  - f/u labs, UA    FULL RECS PENDING ATTENDING LOKESH Gusman pgy2  28y/o  PPD#9 s/p  c/b uterine atony presenting to the ED complaining of abdominal pain. Patient in stable condition, VSS, TVUS with normal findings, enlarged fibroid uterus, WBC ct wnl, afebrile.  - No acute OB intervention  - s/p Tylenol, can take up to 600mg Ibuprofen every 6 hours and/or tylenol 975mg every 6 hours   - Pt to f/u in office next week w/ private OB    D/W Dr. Rueda  Mvula pgy2

## 2022-03-05 NOTE — ED PROVIDER NOTE - PHYSICAL EXAMINATION
Physical Exam:  Gen: Alert, shivering   HEENT: NCAT, EOMI, clear conjunctiva, no scleral icterus, dry mm  Neck: Supple  CV: RRR, S1S2, no m/r/g  Resp: CTAB, normal respiratory effort  Abd: +LLQ and lower midline abd pain. Soft, ND.  : Cervical os closed, no lesions or discharge. small amount of blood in vaginal vault.  Ext: no edema, no clubbing or cyanosis  Neuro: AOx3, CARPENTER  Skin: warm, perfused Physical Exam:  Gen: Alert, rigoring  HEENT: NCAT, EOMI, clear conjunctiva, no scleral icterus, dry mm  Neck: Supple  CV: RRR, S1S2, no m/r/g  Resp: CTAB, normal respiratory effort  Abd: +LLQ and lower midline abd pain. Soft, ND.  : Cervical os closed, no lesions or discharge. small amount of blood in vaginal vault.  Ext: no edema, no clubbing or cyanosis  Neuro: AOx3, CARPENTER  Skin: warm, perfused Physical Exam:  Gen: Alert, rigoring  HEENT: NCAT, EOMI, clear conjunctiva, no scleral icterus, dry mm  Neck: Supple  CV: RRR, S1S2, no m/r/g  Resp: CTAB, normal respiratory effort  Abd: +LLQ and lower midline abd pain. Soft, ND.  : Cervical os closed, no lesions or discharge. small amount of blood in vaginal vault. L adnexal TTP.   Ext: no edema, no clubbing or cyanosis  Neuro: AOx3, CARPENTER  Skin: warm, perfused

## 2023-02-14 ENCOUNTER — INPATIENT (INPATIENT)
Facility: HOSPITAL | Age: 28
LOS: 2 days | Discharge: HOME | End: 2023-02-17
Attending: STUDENT IN AN ORGANIZED HEALTH CARE EDUCATION/TRAINING PROGRAM | Admitting: STUDENT IN AN ORGANIZED HEALTH CARE EDUCATION/TRAINING PROGRAM
Payer: COMMERCIAL

## 2023-02-14 ENCOUNTER — TRANSCRIPTION ENCOUNTER (OUTPATIENT)
Age: 28
End: 2023-02-14

## 2023-02-14 VITALS
TEMPERATURE: 98 F | RESPIRATION RATE: 16 BRPM | HEART RATE: 56 BPM | SYSTOLIC BLOOD PRESSURE: 127 MMHG | DIASTOLIC BLOOD PRESSURE: 85 MMHG | OXYGEN SATURATION: 95 %

## 2023-02-14 DIAGNOSIS — Z98.84 BARIATRIC SURGERY STATUS: Chronic | ICD-10-CM

## 2023-02-14 DIAGNOSIS — R10.9 UNSPECIFIED ABDOMINAL PAIN: ICD-10-CM

## 2023-02-14 LAB
ALBUMIN SERPL ELPH-MCNC: 3.6 G/DL — SIGNIFICANT CHANGE UP (ref 3.3–5)
ALP SERPL-CCNC: 100 U/L — SIGNIFICANT CHANGE UP (ref 40–120)
ALT FLD-CCNC: 17 U/L — SIGNIFICANT CHANGE UP (ref 4–33)
ANION GAP SERPL CALC-SCNC: 15 MMOL/L — HIGH (ref 7–14)
APPEARANCE UR: CLEAR — SIGNIFICANT CHANGE UP
AST SERPL-CCNC: 28 U/L — SIGNIFICANT CHANGE UP (ref 4–32)
BASE EXCESS BLDV CALC-SCNC: -2.2 MMOL/L — LOW (ref -2–3)
BASOPHILS # BLD AUTO: 0.07 K/UL — SIGNIFICANT CHANGE UP (ref 0–0.2)
BASOPHILS NFR BLD AUTO: 0.7 % — SIGNIFICANT CHANGE UP (ref 0–2)
BILIRUB SERPL-MCNC: <0.2 MG/DL — SIGNIFICANT CHANGE UP (ref 0.2–1.2)
BILIRUB UR-MCNC: NEGATIVE — SIGNIFICANT CHANGE UP
BLOOD GAS VENOUS COMPREHENSIVE RESULT: SIGNIFICANT CHANGE UP
BUN SERPL-MCNC: 14 MG/DL — SIGNIFICANT CHANGE UP (ref 7–23)
CALCIUM SERPL-MCNC: 9.1 MG/DL — SIGNIFICANT CHANGE UP (ref 8.4–10.5)
CHLORIDE BLDV-SCNC: 104 MMOL/L — SIGNIFICANT CHANGE UP (ref 96–108)
CHLORIDE SERPL-SCNC: 105 MMOL/L — SIGNIFICANT CHANGE UP (ref 98–107)
CO2 BLDV-SCNC: 25.1 MMOL/L — SIGNIFICANT CHANGE UP (ref 22–26)
CO2 SERPL-SCNC: 18 MMOL/L — LOW (ref 22–31)
COLOR SPEC: SIGNIFICANT CHANGE UP
CREAT SERPL-MCNC: 0.84 MG/DL — SIGNIFICANT CHANGE UP (ref 0.5–1.3)
DIFF PNL FLD: NEGATIVE — SIGNIFICANT CHANGE UP
EGFR: 96 ML/MIN/1.73M2 — SIGNIFICANT CHANGE UP
EOSINOPHIL # BLD AUTO: 0.1 K/UL — SIGNIFICANT CHANGE UP (ref 0–0.5)
EOSINOPHIL NFR BLD AUTO: 1 % — SIGNIFICANT CHANGE UP (ref 0–6)
FLUAV AG NPH QL: SIGNIFICANT CHANGE UP
FLUBV AG NPH QL: SIGNIFICANT CHANGE UP
GAS PNL BLDV: 136 MMOL/L — SIGNIFICANT CHANGE UP (ref 136–145)
GLUCOSE BLDV-MCNC: 103 MG/DL — HIGH (ref 70–99)
GLUCOSE SERPL-MCNC: 100 MG/DL — HIGH (ref 70–99)
GLUCOSE UR QL: NEGATIVE — SIGNIFICANT CHANGE UP
HCO3 BLDV-SCNC: 24 MMOL/L — SIGNIFICANT CHANGE UP (ref 22–29)
HCT VFR BLD CALC: 39.4 % — SIGNIFICANT CHANGE UP (ref 34.5–45)
HCT VFR BLDA CALC: 36 % — SIGNIFICANT CHANGE UP (ref 34.5–46.5)
HGB BLD CALC-MCNC: 11.9 G/DL — SIGNIFICANT CHANGE UP (ref 11.7–16.1)
HGB BLD-MCNC: 11.7 G/DL — SIGNIFICANT CHANGE UP (ref 11.5–15.5)
IANC: 6.34 K/UL — SIGNIFICANT CHANGE UP (ref 1.8–7.4)
IMM GRANULOCYTES NFR BLD AUTO: 0.3 % — SIGNIFICANT CHANGE UP (ref 0–0.9)
KETONES UR-MCNC: NEGATIVE — SIGNIFICANT CHANGE UP
LACTATE BLDV-MCNC: 2.7 MMOL/L — HIGH (ref 0.5–2)
LEUKOCYTE ESTERASE UR-ACNC: NEGATIVE — SIGNIFICANT CHANGE UP
LIDOCAIN IGE QN: 32 U/L — SIGNIFICANT CHANGE UP (ref 7–60)
LYMPHOCYTES # BLD AUTO: 2.8 K/UL — SIGNIFICANT CHANGE UP (ref 1–3.3)
LYMPHOCYTES # BLD AUTO: 27.9 % — SIGNIFICANT CHANGE UP (ref 13–44)
MCHC RBC-ENTMCNC: 25.1 PG — LOW (ref 27–34)
MCHC RBC-ENTMCNC: 29.7 GM/DL — LOW (ref 32–36)
MCV RBC AUTO: 84.4 FL — SIGNIFICANT CHANGE UP (ref 80–100)
MONOCYTES # BLD AUTO: 0.68 K/UL — SIGNIFICANT CHANGE UP (ref 0–0.9)
MONOCYTES NFR BLD AUTO: 6.8 % — SIGNIFICANT CHANGE UP (ref 2–14)
NEUTROPHILS # BLD AUTO: 6.34 K/UL — SIGNIFICANT CHANGE UP (ref 1.8–7.4)
NEUTROPHILS NFR BLD AUTO: 63.3 % — SIGNIFICANT CHANGE UP (ref 43–77)
NITRITE UR-MCNC: NEGATIVE — SIGNIFICANT CHANGE UP
NRBC # BLD: 0 /100 WBCS — SIGNIFICANT CHANGE UP (ref 0–0)
NRBC # FLD: 0 K/UL — SIGNIFICANT CHANGE UP (ref 0–0)
PCO2 BLDV: 44 MMHG — SIGNIFICANT CHANGE UP (ref 39–52)
PH BLDV: 7.34 — SIGNIFICANT CHANGE UP (ref 7.32–7.43)
PH UR: 6.5 — SIGNIFICANT CHANGE UP (ref 5–8)
PLATELET # BLD AUTO: 319 K/UL — SIGNIFICANT CHANGE UP (ref 150–400)
PO2 BLDV: 39 MMHG — SIGNIFICANT CHANGE UP (ref 25–45)
POTASSIUM BLDV-SCNC: 3.8 MMOL/L — SIGNIFICANT CHANGE UP (ref 3.5–5.1)
POTASSIUM SERPL-MCNC: 4.2 MMOL/L — SIGNIFICANT CHANGE UP (ref 3.5–5.3)
POTASSIUM SERPL-SCNC: 4.2 MMOL/L — SIGNIFICANT CHANGE UP (ref 3.5–5.3)
PROT SERPL-MCNC: 7.6 G/DL — SIGNIFICANT CHANGE UP (ref 6–8.3)
PROT UR-MCNC: NEGATIVE — SIGNIFICANT CHANGE UP
RBC # BLD: 4.67 M/UL — SIGNIFICANT CHANGE UP (ref 3.8–5.2)
RBC # FLD: 12.9 % — SIGNIFICANT CHANGE UP (ref 10.3–14.5)
RSV RNA NPH QL NAA+NON-PROBE: SIGNIFICANT CHANGE UP
SAO2 % BLDV: 64.4 % — LOW (ref 67–88)
SARS-COV-2 RNA SPEC QL NAA+PROBE: SIGNIFICANT CHANGE UP
SODIUM SERPL-SCNC: 138 MMOL/L — SIGNIFICANT CHANGE UP (ref 135–145)
SP GR SPEC: 1.04 — SIGNIFICANT CHANGE UP (ref 1.01–1.05)
UROBILINOGEN FLD QL: SIGNIFICANT CHANGE UP
WBC # BLD: 10.02 K/UL — SIGNIFICANT CHANGE UP (ref 3.8–10.5)
WBC # FLD AUTO: 10.02 K/UL — SIGNIFICANT CHANGE UP (ref 3.8–10.5)

## 2023-02-14 PROCEDURE — 76705 ECHO EXAM OF ABDOMEN: CPT | Mod: 26

## 2023-02-14 PROCEDURE — 74177 CT ABD & PELVIS W/CONTRAST: CPT | Mod: 26,ME

## 2023-02-14 PROCEDURE — 99222 1ST HOSP IP/OBS MODERATE 55: CPT | Mod: GC,57

## 2023-02-14 PROCEDURE — 99285 EMERGENCY DEPT VISIT HI MDM: CPT

## 2023-02-14 PROCEDURE — G1004: CPT

## 2023-02-14 RX ORDER — MORPHINE SULFATE 50 MG/1
4 CAPSULE, EXTENDED RELEASE ORAL ONCE
Refills: 0 | Status: DISCONTINUED | OUTPATIENT
Start: 2023-02-14 | End: 2023-02-14

## 2023-02-14 RX ORDER — INFLUENZA VIRUS VACCINE 15; 15; 15; 15 UG/.5ML; UG/.5ML; UG/.5ML; UG/.5ML
0.5 SUSPENSION INTRAMUSCULAR ONCE
Refills: 0 | Status: DISCONTINUED | OUTPATIENT
Start: 2023-02-14 | End: 2023-02-17

## 2023-02-14 RX ORDER — ONDANSETRON 8 MG/1
4 TABLET, FILM COATED ORAL EVERY 4 HOURS
Refills: 0 | Status: DISCONTINUED | OUTPATIENT
Start: 2023-02-14 | End: 2023-02-14

## 2023-02-14 RX ORDER — HYDROMORPHONE HYDROCHLORIDE 2 MG/ML
0.25 INJECTION INTRAMUSCULAR; INTRAVENOUS; SUBCUTANEOUS EVERY 4 HOURS
Refills: 0 | Status: DISCONTINUED | OUTPATIENT
Start: 2023-02-14 | End: 2023-02-15

## 2023-02-14 RX ORDER — ENOXAPARIN SODIUM 100 MG/ML
40 INJECTION SUBCUTANEOUS EVERY 24 HOURS
Refills: 0 | Status: DISCONTINUED | OUTPATIENT
Start: 2023-02-14 | End: 2023-02-17

## 2023-02-14 RX ORDER — PIPERACILLIN AND TAZOBACTAM 4; .5 G/20ML; G/20ML
3.38 INJECTION, POWDER, LYOPHILIZED, FOR SOLUTION INTRAVENOUS ONCE
Refills: 0 | Status: COMPLETED | OUTPATIENT
Start: 2023-02-14 | End: 2023-02-14

## 2023-02-14 RX ORDER — SODIUM CHLORIDE 9 MG/ML
1000 INJECTION, SOLUTION INTRAVENOUS
Refills: 0 | Status: DISCONTINUED | OUTPATIENT
Start: 2023-02-14 | End: 2023-02-14

## 2023-02-14 RX ORDER — HYDROMORPHONE HYDROCHLORIDE 2 MG/ML
0.5 INJECTION INTRAMUSCULAR; INTRAVENOUS; SUBCUTANEOUS EVERY 4 HOURS
Refills: 0 | Status: DISCONTINUED | OUTPATIENT
Start: 2023-02-14 | End: 2023-02-15

## 2023-02-14 RX ORDER — SODIUM CHLORIDE 9 MG/ML
1000 INJECTION INTRAMUSCULAR; INTRAVENOUS; SUBCUTANEOUS ONCE
Refills: 0 | Status: COMPLETED | OUTPATIENT
Start: 2023-02-14 | End: 2023-02-14

## 2023-02-14 RX ORDER — ACETAMINOPHEN 500 MG
1000 TABLET ORAL ONCE
Refills: 0 | Status: COMPLETED | OUTPATIENT
Start: 2023-02-14 | End: 2023-02-14

## 2023-02-14 RX ORDER — SODIUM CHLORIDE 9 MG/ML
1000 INJECTION, SOLUTION INTRAVENOUS
Refills: 0 | Status: DISCONTINUED | OUTPATIENT
Start: 2023-02-14 | End: 2023-02-15

## 2023-02-14 RX ORDER — HYDROMORPHONE HYDROCHLORIDE 2 MG/ML
1 INJECTION INTRAMUSCULAR; INTRAVENOUS; SUBCUTANEOUS ONCE
Refills: 0 | Status: DISCONTINUED | OUTPATIENT
Start: 2023-02-14 | End: 2023-02-14

## 2023-02-14 RX ORDER — IOHEXOL 300 MG/ML
30 INJECTION, SOLUTION INTRAVENOUS ONCE
Refills: 0 | Status: COMPLETED | OUTPATIENT
Start: 2023-02-14 | End: 2023-02-14

## 2023-02-14 RX ORDER — PIPERACILLIN AND TAZOBACTAM 4; .5 G/20ML; G/20ML
3.38 INJECTION, POWDER, LYOPHILIZED, FOR SOLUTION INTRAVENOUS EVERY 8 HOURS
Refills: 0 | Status: DISCONTINUED | OUTPATIENT
Start: 2023-02-15 | End: 2023-02-15

## 2023-02-14 RX ORDER — SODIUM CHLORIDE 9 MG/ML
1000 INJECTION INTRAMUSCULAR; INTRAVENOUS; SUBCUTANEOUS
Refills: 0 | Status: DISCONTINUED | OUTPATIENT
Start: 2023-02-14 | End: 2023-02-14

## 2023-02-14 RX ORDER — FAMOTIDINE 10 MG/ML
20 INJECTION INTRAVENOUS ONCE
Refills: 0 | Status: COMPLETED | OUTPATIENT
Start: 2023-02-14 | End: 2023-02-14

## 2023-02-14 RX ORDER — ONDANSETRON 8 MG/1
4 TABLET, FILM COATED ORAL ONCE
Refills: 0 | Status: COMPLETED | OUTPATIENT
Start: 2023-02-14 | End: 2023-02-14

## 2023-02-14 RX ORDER — ACETAMINOPHEN 500 MG
1000 TABLET ORAL EVERY 6 HOURS
Refills: 0 | Status: COMPLETED | OUTPATIENT
Start: 2023-02-14 | End: 2023-02-14

## 2023-02-14 RX ORDER — PIPERACILLIN AND TAZOBACTAM 4; .5 G/20ML; G/20ML
3.38 INJECTION, POWDER, LYOPHILIZED, FOR SOLUTION INTRAVENOUS ONCE
Refills: 0 | Status: COMPLETED | OUTPATIENT
Start: 2023-02-15 | End: 2023-02-14

## 2023-02-14 RX ADMIN — MORPHINE SULFATE 4 MILLIGRAM(S): 50 CAPSULE, EXTENDED RELEASE ORAL at 05:05

## 2023-02-14 RX ADMIN — MORPHINE SULFATE 4 MILLIGRAM(S): 50 CAPSULE, EXTENDED RELEASE ORAL at 13:29

## 2023-02-14 RX ADMIN — HYDROMORPHONE HYDROCHLORIDE 1 MILLIGRAM(S): 2 INJECTION INTRAMUSCULAR; INTRAVENOUS; SUBCUTANEOUS at 05:30

## 2023-02-14 RX ADMIN — ONDANSETRON 4 MILLIGRAM(S): 8 TABLET, FILM COATED ORAL at 23:31

## 2023-02-14 RX ADMIN — PIPERACILLIN AND TAZOBACTAM 200 GRAM(S): 4; .5 INJECTION, POWDER, LYOPHILIZED, FOR SOLUTION INTRAVENOUS at 14:00

## 2023-02-14 RX ADMIN — HYDROMORPHONE HYDROCHLORIDE 0.5 MILLIGRAM(S): 2 INJECTION INTRAMUSCULAR; INTRAVENOUS; SUBCUTANEOUS at 14:34

## 2023-02-14 RX ADMIN — SODIUM CHLORIDE 1000 MILLILITER(S): 9 INJECTION INTRAMUSCULAR; INTRAVENOUS; SUBCUTANEOUS at 05:13

## 2023-02-14 RX ADMIN — IOHEXOL 30 MILLILITER(S): 300 INJECTION, SOLUTION INTRAVENOUS at 05:44

## 2023-02-14 RX ADMIN — HYDROMORPHONE HYDROCHLORIDE 0.5 MILLIGRAM(S): 2 INJECTION INTRAMUSCULAR; INTRAVENOUS; SUBCUTANEOUS at 21:10

## 2023-02-14 RX ADMIN — HYDROMORPHONE HYDROCHLORIDE 0.5 MILLIGRAM(S): 2 INJECTION INTRAMUSCULAR; INTRAVENOUS; SUBCUTANEOUS at 13:59

## 2023-02-14 RX ADMIN — ONDANSETRON 4 MILLIGRAM(S): 8 TABLET, FILM COATED ORAL at 05:05

## 2023-02-14 RX ADMIN — Medication 400 MILLIGRAM(S): at 17:13

## 2023-02-14 RX ADMIN — Medication 400 MILLIGRAM(S): at 10:23

## 2023-02-14 RX ADMIN — Medication 1000 MILLIGRAM(S): at 13:26

## 2023-02-14 RX ADMIN — PIPERACILLIN AND TAZOBACTAM 25 GRAM(S): 4; .5 INJECTION, POWDER, LYOPHILIZED, FOR SOLUTION INTRAVENOUS at 17:32

## 2023-02-14 RX ADMIN — FAMOTIDINE 20 MILLIGRAM(S): 10 INJECTION INTRAVENOUS at 12:01

## 2023-02-14 RX ADMIN — SODIUM CHLORIDE 250 MILLILITER(S): 9 INJECTION, SOLUTION INTRAVENOUS at 06:03

## 2023-02-14 RX ADMIN — MORPHINE SULFATE 4 MILLIGRAM(S): 50 CAPSULE, EXTENDED RELEASE ORAL at 12:11

## 2023-02-14 RX ADMIN — SODIUM CHLORIDE 100 MILLILITER(S): 9 INJECTION, SOLUTION INTRAVENOUS at 14:26

## 2023-02-14 RX ADMIN — PIPERACILLIN AND TAZOBACTAM 25 GRAM(S): 4; .5 INJECTION, POWDER, LYOPHILIZED, FOR SOLUTION INTRAVENOUS at 23:31

## 2023-02-14 RX ADMIN — HYDROMORPHONE HYDROCHLORIDE 0.5 MILLIGRAM(S): 2 INJECTION INTRAMUSCULAR; INTRAVENOUS; SUBCUTANEOUS at 20:32

## 2023-02-14 RX ADMIN — Medication 1000 MILLIGRAM(S): at 18:11

## 2023-02-14 RX ADMIN — Medication 30 MILLILITER(S): at 11:58

## 2023-02-14 NOTE — ED ADULT NURSE REASSESSMENT NOTE - AS PAIN REST
0 (no pain/absence of nonverbal indicators of pain)
6 (moderate pain)
1 (mild pain)
2 (mild pain)
6 (moderate pain)
10 (severe pain)
5 (moderate pain)

## 2023-02-14 NOTE — ED PROVIDER NOTE - OBJECTIVE STATEMENT
28-year-old female past medical history of a gastric sleeve approximately 1 year ago at [facility] and no other significant past medical history presenting with 1 day of epigastric discomfort.  Described as pain mostly however at times there is a burning sensation.  Does radiate to the upper abdomen bilaterally.  This evening started with some nausea but no vomiting.  Reports normal bowel movements and passing flatus.  No history of similar pains in the past.  No fevers no vomiting

## 2023-02-14 NOTE — ED PROVIDER NOTE - PHYSICAL EXAMINATION
Vitals: I have reviewed the patients vital signs  General: nontoxic appearing  HEENT: Atraumatic, normocephalic, airway patent  Eyes: EOMI, tracking appropriately  Neck: no tracheal deviation  Chest/Lungs: no trauma, symmetric chest rise, speaking in complete sentences,  no resp distress  Heart: skin and extremities well perfused, regular rate and rhythm  Neuro: A+Ox3, appears non focal  MSK: no deformities  Skin: no cyanosis, no jaundice   Psych:  Normal mood and affect  Abdomen: Soft tender to palpation epigastric right upper quadrant negative Elizabeth's no rebound no guarding

## 2023-02-14 NOTE — CONSULT NOTE ADULT - ASSESSMENT
ASSESSMENT: Patient is a 28y old f with pmh of sleeve, pregnancy, reports had bulging at pregnancy. Concern for possible hernia.    PLAN:    - Please expedite CT scan    Plan discussed with Dr. Leonard Jama  General Surgery  B Team  [id number]

## 2023-02-14 NOTE — PATIENT PROFILE ADULT - HISTORY OF COVID-19 VACCINATION
Vaccine status unknown Yes Cephalexin Counseling: I counseled the patient regarding use of cephalexin as an antibiotic for prophylactic and/or therapeutic purposes. Cephalexin (commonly prescribed under brand name Keflex) is a cephalosporin antibiotic which is active against numerous classes of bacteria, including most skin bacteria. Side effects may include nausea, diarrhea, gastrointestinal upset, rash, hives, yeast infections, and in rare cases, hepatitis, kidney disease, seizures, fever, confusion, neurologic symptoms, and others. Patients with severe allergies to penicillin medications are cautioned that there is about a 10% incidence of cross-reactivity with cephalosporins. When possible, patients with penicillin allergies should use alternatives to cephalosporins for antibiotic therapy.

## 2023-02-14 NOTE — ED ADULT NURSE REASSESSMENT NOTE - NS ED NURSE REASSESS COMMENT FT1
Pt aaox4, ambulatory, breathing even and unlabored in bed. Pt denies SOB, chest pain, dizziness and headache. Pt states her pain is decreased and she is at a tolerable pain level. Pt went back to sleep in stretcher. Bed in lowest position,  at bedside, awaiting surgery consult.
Pt aaox4, ambulatory, breathing even and unlabored in bed. Pt denies SOB, chest pain, dizziness, and headache. Pt states the medication decreased her pain to a 2/10 tolerable level. Bed in lowest position,  at bedside.
Pt aaox4, ambulatory, breathing even and unlabored in bed. Pt denies SOB, chest pain, dizziness and headache. Pt states the pain did not decrease a lot after the medication. MD made aware.
Pt at baseline mental status, pt denies chest pain, headache and dizziness. Pt states she is experiencing 10/10 epigastric burning pain. Pre medication vital signs. Pt states she is experiencing SOB from the pain. Saturation within normal limits, pt breathing even and unlabored, MD aware.
Pt aaox4, ambulatory, breathing even and unlabored in bed. Pt denies SOB, chest pain, dizziness and headache. Pt states she is experiencing 6/10 epigastric pain. Pt medicated as per eMar, Bed in lowest position, family at bedside.
Report received from night shift RN. Pt aaox4, breathing even and unlabored in bed. Pt denies SOB, chest pain, dizziness, headache. Pt complaining of 5/10 abdominal pain. Bed in lowest position, call bell within reach.
`pt aaox4, ambulatory, breathing even and unlabored in bed. Pt denies SOB, chest pain, dizziness, headache, and pain. Pt states the medication made her pain go down to a zero.

## 2023-02-14 NOTE — CONSULT NOTE ADULT - SUBJECTIVE AND OBJECTIVE BOX
GENERAL SURGERY CONSULT NOTE  --------------------------------------------------------------------------------------------    HPI:   Patient is a 28y old  Female who presents with a chief complaint of abdominal pain    HPI:  28F prior history of sleeve 1 yr ago at [facility] with Dr. watson and pregnancy 3 months later presenting with 2 days of abdominal pain.    Patient reports supraumbilical abdominal pain waking up from sleep 2 days ago, intermittent with dull and sharp sensations. Nausea but no vomiting. Is passing gas and had bm earlier today.  Denies other surgical history. Has chills.      ROS: 10-system review is otherwise negative except HPI above.      PAST MEDICAL & SURGICAL HISTORY:  No pertinent past medical history      H/O bariatric surgery  gastric sleeve in 2021        FAMILY HISTORY:  [] Family history not pertinent as reviewed with the patient and family    SOCIAL HISTORY:    Denies tobacco use  Denies alcohol use    ALLERGIES: No Known Allergies      HOME MEDICATIONS:   Non compliant with OCPs    CURRENT MEDICATIONS  MEDICATIONS (STANDING): sodium chloride 0.9% 1000 milliLiter(s) IV Continuous <Continuous>    MEDICATIONS (PRN):  --------------------------------------------------------------------------------------------    Vitals:   T(C): 37.2 (02-14-23 @ 05:25), Max: 37.2 (02-14-23 @ 05:25)  HR: 68 (02-14-23 @ 05:25) (56 - 68)  BP: 155/90 (02-14-23 @ 05:25) (127/85 - 155/90)  RR: 15 (02-14-23 @ 05:25) (15 - 16)  SpO2: 100% (02-14-23 @ 05:25) (95% - 100%)  CAPILLARY BLOOD GLUCOSE              PHYSICAL EXAM:   General: NAD, Lying in bed comfortably  Neuro: A+Ox3  Cardio: Regular rate  Resp: Good effor  GI/Abd: Soft, tender at supraumbilical area with possible bulg felt on exam although exam limited by habitus  Vascular: All 4 extremities warm.  Skin: Intact, no breakdown  --------------------------------------------------------------------------------------------    LABS  CBC (02-14 @ 05:02)                              11.7                           10.02   )----------------(  319        63.3  % Neutrophils, 27.9  % Lymphocytes, ANC: 6.34                                39.4      BMP (02-14 @ 05:02)             138     |  105     |  14    		Ca++ --      Ca 9.1                ---------------------------------( 100<H>		Mg --                 4.2     |  18<L>   |  0.84  			Ph --        LFTs (02-14 @ 05:02)      TPro 7.6 / Alb 3.6 / TBili <0.2 / DBili -- / AST 28 / ALT 17 / AlkPhos 100          VBG (02-14 @ 05:02)     7.34 / 44 / 39 / 24 / -2.2<L> / 64.4<L>%     Lactate: 2.7<H>    --------------------------------------------------------------------------------------------    MICROBIOLOGY      --------------------------------------------------------------------------------------------  --------------------------------------------------------------------------------------------

## 2023-02-14 NOTE — H&P ADULT - HISTORY OF PRESENT ILLNESS
GENERAL SURGERY H&P  --------------------------------------------------------------------------------------------    HPI:   Patient is a 28y old Female who presents with a chief complaint of abdominal pain    HPI:  28F prior history of sleeve 1 yr ago at [facility] with Dr. watson and pregnancy 3 months later presenting with 2 days of abdominal pain.    Patient reports supraumbilical abdominal pain waking up from sleep 2 days ago, intermittent with dull and sharp sensations. Nausea but no vomiting. Is passing gas and had bm earlier today. Denies other surgical history. Has chills.      ROS: 10-system review is otherwise negative except HPI above.      PAST MEDICAL & SURGICAL HISTORY:  No pertinent past medical history      H/O bariatric surgery  gastric sleeve in 2021        FAMILY HISTORY:  [] Family history not pertinent as reviewed with the patient and family    SOCIAL HISTORY:    Denies tobacco use  Denies alcohol use    ALLERGIES: No Known Allergies      HOME MEDICATIONS:   Non compliant with OCPs    CURRENT MEDICATIONS  MEDICATIONS (STANDING): sodium chloride 0.9% 1000 milliLiter(s) IV Continuous <Continuous>    MEDICATIONS (PRN):  --------------------------------------------------------------------------------------------    Vitals:   T(C): 37.2 (02-14-23 @ 05:25), Max: 37.2 (02-14-23 @ 05:25)  HR: 68 (02-14-23 @ 05:25) (56 - 68)  BP: 155/90 (02-14-23 @ 05:25) (127/85 - 155/90)  RR: 15 (02-14-23 @ 05:25) (15 - 16)  SpO2: 100% (02-14-23 @ 05:25) (95% - 100%)  CAPILLARY BLOOD GLUCOSE              PHYSICAL EXAM:   General: NAD, Lying in bed comfortably  Neuro: A+Ox3  Cardio: Regular rate  Resp: Good effor  GI/Abd: Soft, tender at supraumbilical area with possible bulg felt on exam although exam limited by habitus  Vascular: All 4 extremities warm.  Skin: Intact, no breakdown  --------------------------------------------------------------------------------------------    LABS  CBC (02-14 @ 05:02)                              11.7                           10.02   )----------------(  319        63.3  % Neutrophils, 27.9  % Lymphocytes, ANC: 6.34                                39.4      BMP (02-14 @ 05:02)             138     |  105     |  14    		Ca++ --      Ca 9.1                ---------------------------------( 100<H>		Mg --                 4.2     |  18<L>   |  0.84  			Ph --        LFTs (02-14 @ 05:02)      TPro 7.6 / Alb 3.6 / TBili <0.2 / DBili -- / AST 28 / ALT 17 / AlkPhos 100          VBG (02-14 @ 05:02)     7.34 / 44 / 39 / 24 / -2.2<L> / 64.4<L>%     Lactate: 2.7<H>    --------------------------------------------------------------------------------------------    MICROBIOLOGY      --------------------------------------------------------------------------------------------  --------------------------------------------------------------------------------------------        Assessment and Recommendation:   · Assessment	  ASSESSMENT: Patient is a 28y old f with pmh of sleeve, pregnancy presenting with epigastric pain, and imaging showing thickened gallbladder with stone in gallbladder neck     PLAN:    - Admit to General Surgery, Dr. Valle  - Plan for OR tomorrow - Laparoscopic Cholecystectomy, Possible Open  - NPO/IVF  - Antibiotics: Zosyn  - DVT ppx: Lovenox  - Discussed with Dr. Leonard Bennett [place]  General Surgery  B Team  [id number]

## 2023-02-14 NOTE — H&P ADULT - ATTENDING COMMENTS
Patient with history of sleeve gastrectomy, Patient with epigastric abdominal pain, ct scan showing acute cholecystitis.  iv abx  or for lap zaida    I have personally interviewed and examined this patient, reviewed pertinent labs and imaging, and discussed the case with colleagues, residents, and physician assistants on B Team rounds.    The active care issues are:  1. acute cholecystitis    The Acute Care Surgery (B Team) Attending Group Practice:  Dr. Dez Shafer, Dr. Carloz Cameron, Dr. Jaskaran Perkins, Dr. Twan Valle,     urgent issues - spectra [phone]  nonurgent issues - [phone]  patient appointments or afterhours - [phone]

## 2023-02-14 NOTE — ED ADULT TRIAGE NOTE - CHIEF COMPLAINT QUOTE
Pt. c/o generalized abdominal pain that began yesterday. denies nausea, vomiting, diarrhea. PHx gastric sleeve.

## 2023-02-14 NOTE — ED ADULT NURSE NOTE - OBJECTIVE STATEMENT
presents C/O epigastric pain and burning. with nausea. denies vomiting. No complaints of chest pain, headache,  dizziness, vomiting  SOB, fever, chills verbalized. ABD is soft, non tender, non distended. + flatus. +bowel movement. Respirations even and unlabored with equal chest rise bilaterally. surgery at bedside. 20G Iv placed in right hand. labs sent. Pt awaiting CT scan. medicated for pain. will continue to monitor

## 2023-02-14 NOTE — ED PROVIDER NOTE - CLINICAL SUMMARY MEDICAL DECISION MAKING FREE TEXT BOX
28-year-old female with a past medical history of gastric sleeve presenting with epigastric abdominal pain.  Differential is broad and includes but is not limited to pancreatitis, gallbladder disease, internal hernia, GERD, peptic ulcer disease.  Based on hospital protocol patient is bariatric in nature we will get a surgery consult.  Patient is going to also need a right upper quadrant ultrasound.  If this is negative we will proceed to get a CAT scan as well.  Laboratory studies will assess for other abnormalities.  We will treat symptomatically with morphine and Zofran for pain and antiemetic respectively.  Please see progress notes for final disposition of patient as well as further ED course.

## 2023-02-14 NOTE — ED ADULT NURSE REASSESSMENT NOTE - PAIN RATING/NUMBER SCALE (0-10): ACTIVITY
6 (moderate pain)
1 (mild pain)
10 (severe pain)
2 (mild pain)
0 (no pain/absence of nonverbal indicators of pain)
6 (moderate pain)
5 (moderate pain)

## 2023-02-15 ENCOUNTER — RESULT REVIEW (OUTPATIENT)
Age: 28
End: 2023-02-15

## 2023-02-15 LAB
ALBUMIN SERPL ELPH-MCNC: 3.5 G/DL — SIGNIFICANT CHANGE UP (ref 3.3–5)
ALP SERPL-CCNC: 281 U/L — HIGH (ref 40–120)
ALT FLD-CCNC: 248 U/L — HIGH (ref 4–33)
ANION GAP SERPL CALC-SCNC: 9 MMOL/L — SIGNIFICANT CHANGE UP (ref 7–14)
APTT BLD: 25.8 SEC — LOW (ref 27–36.3)
AST SERPL-CCNC: 226 U/L — HIGH (ref 4–32)
BILIRUB SERPL-MCNC: 3.2 MG/DL — HIGH (ref 0.2–1.2)
BLD GP AB SCN SERPL QL: NEGATIVE — SIGNIFICANT CHANGE UP
BUN SERPL-MCNC: 6 MG/DL — LOW (ref 7–23)
CALCIUM SERPL-MCNC: 8.8 MG/DL — SIGNIFICANT CHANGE UP (ref 8.4–10.5)
CHLORIDE SERPL-SCNC: 101 MMOL/L — SIGNIFICANT CHANGE UP (ref 98–107)
CO2 SERPL-SCNC: 23 MMOL/L — SIGNIFICANT CHANGE UP (ref 22–31)
CREAT SERPL-MCNC: 0.66 MG/DL — SIGNIFICANT CHANGE UP (ref 0.5–1.3)
EGFR: 122 ML/MIN/1.73M2 — SIGNIFICANT CHANGE UP
GLUCOSE SERPL-MCNC: 109 MG/DL — HIGH (ref 70–99)
HCT VFR BLD CALC: 38.7 % — SIGNIFICANT CHANGE UP (ref 34.5–45)
HGB BLD-MCNC: 11.7 G/DL — SIGNIFICANT CHANGE UP (ref 11.5–15.5)
INR BLD: 1.12 RATIO — SIGNIFICANT CHANGE UP (ref 0.88–1.16)
MAGNESIUM SERPL-MCNC: 1.6 MG/DL — SIGNIFICANT CHANGE UP (ref 1.6–2.6)
MCHC RBC-ENTMCNC: 25 PG — LOW (ref 27–34)
MCHC RBC-ENTMCNC: 30.2 GM/DL — LOW (ref 32–36)
MCV RBC AUTO: 82.7 FL — SIGNIFICANT CHANGE UP (ref 80–100)
NRBC # BLD: 0 /100 WBCS — SIGNIFICANT CHANGE UP (ref 0–0)
NRBC # FLD: 0 K/UL — SIGNIFICANT CHANGE UP (ref 0–0)
PHOSPHATE SERPL-MCNC: 2.4 MG/DL — LOW (ref 2.5–4.5)
PLATELET # BLD AUTO: 271 K/UL — SIGNIFICANT CHANGE UP (ref 150–400)
POTASSIUM SERPL-MCNC: 4 MMOL/L — SIGNIFICANT CHANGE UP (ref 3.5–5.3)
POTASSIUM SERPL-SCNC: 4 MMOL/L — SIGNIFICANT CHANGE UP (ref 3.5–5.3)
PROT SERPL-MCNC: 7.2 G/DL — SIGNIFICANT CHANGE UP (ref 6–8.3)
PROTHROM AB SERPL-ACNC: 13 SEC — SIGNIFICANT CHANGE UP (ref 10.5–13.4)
RBC # BLD: 4.68 M/UL — SIGNIFICANT CHANGE UP (ref 3.8–5.2)
RBC # FLD: 13.1 % — SIGNIFICANT CHANGE UP (ref 10.3–14.5)
RH IG SCN BLD-IMP: POSITIVE — SIGNIFICANT CHANGE UP
SODIUM SERPL-SCNC: 133 MMOL/L — LOW (ref 135–145)
WBC # BLD: 9.93 K/UL — SIGNIFICANT CHANGE UP (ref 3.8–10.5)
WBC # FLD AUTO: 9.93 K/UL — SIGNIFICANT CHANGE UP (ref 3.8–10.5)

## 2023-02-15 PROCEDURE — 47564 LAPARO CHOLECYSTECTOMY/EXPLR: CPT | Mod: GC

## 2023-02-15 PROCEDURE — 74018 RADEX ABDOMEN 1 VIEW: CPT | Mod: 26

## 2023-02-15 PROCEDURE — 47550 BILE DUCT ENDOSCOPY ADD-ON: CPT | Mod: GC

## 2023-02-15 PROCEDURE — 88304 TISSUE EXAM BY PATHOLOGIST: CPT | Mod: 26

## 2023-02-15 DEVICE — STONE BASKET SPYGLASS DISCOVER 2.4FR 120CM: Type: IMPLANTABLE DEVICE | Status: FUNCTIONAL

## 2023-02-15 DEVICE — CATH BLLN DILATION DISCOVER 7X40MM: Type: IMPLANTABLE DEVICE | Status: FUNCTIONAL

## 2023-02-15 DEVICE — GUIDEWIRE DISCOVER JAGWIRE 2/BX: Type: IMPLANTABLE DEVICE | Status: FUNCTIONAL

## 2023-02-15 DEVICE — CLIP APPLIER COVIDIEN ENDOCLIP II 10MM MED/LG: Type: IMPLANTABLE DEVICE | Status: FUNCTIONAL

## 2023-02-15 DEVICE — INTRO SHEATH SUPER CBDE 10/BX: Type: IMPLANTABLE DEVICE | Status: FUNCTIONAL

## 2023-02-15 DEVICE — CHOLANGIOGRAM CATH BOSTON SCIENTIFIC SPYGLASS DISCOVER T/A: Type: IMPLANTABLE DEVICE | Status: FUNCTIONAL

## 2023-02-15 DEVICE — LIGATING CLIPS WECK HEMOLOK POLYMER LARGE (PURPLE) 6: Type: IMPLANTABLE DEVICE | Status: FUNCTIONAL

## 2023-02-15 DEVICE — CATH REDDICK W/TROCAR 4FRX50CM: Type: IMPLANTABLE DEVICE | Status: FUNCTIONAL

## 2023-02-15 DEVICE — CATH SPYGLASS DISCOVER IMAGER II IOC: Type: IMPLANTABLE DEVICE | Status: FUNCTIONAL

## 2023-02-15 RX ORDER — SODIUM,POTASSIUM PHOSPHATES 278-250MG
1 POWDER IN PACKET (EA) ORAL ONCE
Refills: 0 | Status: COMPLETED | OUTPATIENT
Start: 2023-02-15 | End: 2023-02-15

## 2023-02-15 RX ORDER — OXYCODONE HYDROCHLORIDE 5 MG/1
5 TABLET ORAL ONCE
Refills: 0 | Status: DISCONTINUED | OUTPATIENT
Start: 2023-02-15 | End: 2023-02-15

## 2023-02-15 RX ORDER — ONDANSETRON 8 MG/1
4 TABLET, FILM COATED ORAL ONCE
Refills: 0 | Status: DISCONTINUED | OUTPATIENT
Start: 2023-02-15 | End: 2023-02-15

## 2023-02-15 RX ORDER — ACETAMINOPHEN 500 MG
1000 TABLET ORAL EVERY 6 HOURS
Refills: 0 | Status: COMPLETED | OUTPATIENT
Start: 2023-02-15 | End: 2023-02-16

## 2023-02-15 RX ORDER — OXYCODONE HYDROCHLORIDE 5 MG/1
5 TABLET ORAL EVERY 4 HOURS
Refills: 0 | Status: DISCONTINUED | OUTPATIENT
Start: 2023-02-15 | End: 2023-02-16

## 2023-02-15 RX ORDER — HYDROMORPHONE HYDROCHLORIDE 2 MG/ML
1 INJECTION INTRAMUSCULAR; INTRAVENOUS; SUBCUTANEOUS ONCE
Refills: 0 | Status: DISCONTINUED | OUTPATIENT
Start: 2023-02-15 | End: 2023-02-15

## 2023-02-15 RX ORDER — CALCIUM CARBONATE 500(1250)
2 TABLET ORAL ONCE
Refills: 0 | Status: COMPLETED | OUTPATIENT
Start: 2023-02-15 | End: 2023-02-15

## 2023-02-15 RX ORDER — FENTANYL CITRATE 50 UG/ML
25 INJECTION INTRAVENOUS
Refills: 0 | Status: DISCONTINUED | OUTPATIENT
Start: 2023-02-15 | End: 2023-02-15

## 2023-02-15 RX ORDER — HYDROMORPHONE HYDROCHLORIDE 2 MG/ML
0.5 INJECTION INTRAMUSCULAR; INTRAVENOUS; SUBCUTANEOUS
Refills: 0 | Status: DISCONTINUED | OUTPATIENT
Start: 2023-02-15 | End: 2023-02-15

## 2023-02-15 RX ORDER — MAGNESIUM SULFATE 500 MG/ML
2 VIAL (ML) INJECTION ONCE
Refills: 0 | Status: COMPLETED | OUTPATIENT
Start: 2023-02-15 | End: 2023-02-15

## 2023-02-15 RX ADMIN — Medication 2 TABLET(S): at 11:59

## 2023-02-15 RX ADMIN — Medication 1000 MILLIGRAM(S): at 12:29

## 2023-02-15 RX ADMIN — HYDROMORPHONE HYDROCHLORIDE 0.5 MILLIGRAM(S): 2 INJECTION INTRAMUSCULAR; INTRAVENOUS; SUBCUTANEOUS at 07:13

## 2023-02-15 RX ADMIN — HYDROMORPHONE HYDROCHLORIDE 0.5 MILLIGRAM(S): 2 INJECTION INTRAMUSCULAR; INTRAVENOUS; SUBCUTANEOUS at 12:29

## 2023-02-15 RX ADMIN — HYDROMORPHONE HYDROCHLORIDE 0.5 MILLIGRAM(S): 2 INJECTION INTRAMUSCULAR; INTRAVENOUS; SUBCUTANEOUS at 03:02

## 2023-02-15 RX ADMIN — HYDROMORPHONE HYDROCHLORIDE 1 MILLIGRAM(S): 2 INJECTION INTRAMUSCULAR; INTRAVENOUS; SUBCUTANEOUS at 09:31

## 2023-02-15 RX ADMIN — HYDROMORPHONE HYDROCHLORIDE 0.5 MILLIGRAM(S): 2 INJECTION INTRAMUSCULAR; INTRAVENOUS; SUBCUTANEOUS at 19:45

## 2023-02-15 RX ADMIN — Medication 400 MILLIGRAM(S): at 11:59

## 2023-02-15 RX ADMIN — HYDROMORPHONE HYDROCHLORIDE 0.5 MILLIGRAM(S): 2 INJECTION INTRAMUSCULAR; INTRAVENOUS; SUBCUTANEOUS at 03:17

## 2023-02-15 RX ADMIN — ENOXAPARIN SODIUM 40 MILLIGRAM(S): 100 INJECTION SUBCUTANEOUS at 05:43

## 2023-02-15 RX ADMIN — Medication 25 GRAM(S): at 09:35

## 2023-02-15 RX ADMIN — HYDROMORPHONE HYDROCHLORIDE 0.5 MILLIGRAM(S): 2 INJECTION INTRAMUSCULAR; INTRAVENOUS; SUBCUTANEOUS at 11:59

## 2023-02-15 RX ADMIN — HYDROMORPHONE HYDROCHLORIDE 1 MILLIGRAM(S): 2 INJECTION INTRAMUSCULAR; INTRAVENOUS; SUBCUTANEOUS at 10:00

## 2023-02-15 RX ADMIN — HYDROMORPHONE HYDROCHLORIDE 0.5 MILLIGRAM(S): 2 INJECTION INTRAMUSCULAR; INTRAVENOUS; SUBCUTANEOUS at 07:28

## 2023-02-15 RX ADMIN — PIPERACILLIN AND TAZOBACTAM 25 GRAM(S): 4; .5 INJECTION, POWDER, LYOPHILIZED, FOR SOLUTION INTRAVENOUS at 05:43

## 2023-02-15 RX ADMIN — HYDROMORPHONE HYDROCHLORIDE 0.5 MILLIGRAM(S): 2 INJECTION INTRAMUSCULAR; INTRAVENOUS; SUBCUTANEOUS at 19:17

## 2023-02-15 RX ADMIN — Medication 1 TABLET(S): at 12:16

## 2023-02-15 NOTE — BRIEF OPERATIVE NOTE - OPERATION/FINDINGS
Laparoscopic Cholecystectomy   - Inflamed gallbladder   - CBD filling defect noted on IOC, proceeded with transcystic CBD bile duct exploration (choledochoscopy) and pushed debris into duodenum  - Completion cholangiogram with no filling defects

## 2023-02-15 NOTE — PROGRESS NOTE ADULT - ASSESSMENT
28F old f with pmh of sleeve, pregnancy presenting with epigastric pain, and imaging showing thickened gallbladder with stone in gallbladder neck     PLAN:    - NPO/IVF  - OR planning for cholecystectomy  - Antibiotics: Zosyn  - DVT ppx: Lovenox    B Team Surgery [id number]

## 2023-02-15 NOTE — PRE-OP CHECKLIST - NS PREOP CHK TEST_COVID RESULT_GEN_ALL_CORE
RAPID RESPONSE TEAM 
 
Responded to overhead rapid response to 2290 
 
Patient in AFIB w/RVR 's-140's, w/chest pressure/tightness. Denies SOB or pain on inspiration, appears tachypneic, RR 40. Patient very pale with cool extremities. Family at bedside 
 
Discussed with Dr. Hoang. STAT EKG obtained:AFIB w/RVR. STAT CBC and STAT trop ordered per protocol. Telephone order with read back received for amio bolus and gtt. Primary nurse to notify cardiology. 
 
Patient to remain in 2290. 
 
 
Noemi Hooper RN 
RRT, [phone] 
 
 
 
 Negative

## 2023-02-15 NOTE — BRIEF OPERATIVE NOTE - NSICDXBRIEFPROCEDURE_GEN_ALL_CORE_FT
PROCEDURES:  Laparoscopic cholecystectomy 15-Feb-2023 18:29:37  Roberto Laureano  Common bile duct exploration 15-Feb-2023 18:30:02  Roberto Laureano

## 2023-02-15 NOTE — CHART NOTE - NSCHARTNOTEFT_GEN_A_CORE
Surgery Post op Note    Procedure: Laparoscopic Cholecystectomy, IOC, CBD exploration    Subjective: Patient seen and evaluated at the bedside. Sleeping comfortably, not endorsing any abdominal pain. No nausea, vomiting. Voiding adequately but no gas/ BM yet.     Objective:   Vital Signs Last 24 Hrs  T(C): 37.1 (15 Feb 2023 21:26), Max: 38.3 (15 Feb 2023 09:41)  T(F): 98.7 (15 Feb 2023 21:), Max: 100.9 (15 Feb 2023 09:41)  HR: 95 (15 Feb 2023 21:) (83 - 119)  BP: 132/80 (15 Feb 2023 21:) (102/56 - 144/98)  BP(mean): 88 (15 Feb 2023 20:15) (65 - 95)  RR: 16 (15 Feb 2023 21:) (14 - 26)  SpO2: 96% (15 Feb 2023 21:26) (93% - 100%)    Parameters below as of 15 Feb 2023 21:26  Patient On (Oxygen Delivery Method): room air      I&O's Summary    2023 07:  -  15 Feb 2023 07:00  --------------------------------------------------------  IN: 1100 mL / OUT: 1200 mL / NET: -100 mL    15 Feb 2023 07:  -  15 Feb 2023 22:40  --------------------------------------------------------  IN: 500 mL / OUT: 251 mL / NET: 249 mL      I&O's Detail    2023 07:01  -  15 Feb 2023 07:00  --------------------------------------------------------  IN:    IV PiggyBack: 200 mL    Lactated Ringers: 900 mL  Total IN: 1100 mL    OUT:    Oral Fluid: 0 mL    Voided (mL): 1200 mL  Total OUT: 1200 mL    Total NET: -100 mL      15 Feb 2023 07:01  -  15 Feb 2023 22:40  --------------------------------------------------------  IN:    IV PiggyBack: 100 mL    Lactated Ringers: 200 mL    Oral Fluid: 200 mL  Total IN: 500 mL    OUT:    Voided (mL): 251 mL  Total OUT: 251 mL    Total NET: 249 mL      Labs:                        11.7   9.93  )-----------( 271      ( 15 Feb 2023 06:29 )             38.7     02-15    133<L>  |  101  |  6<L>  ----------------------------<  109<H>  4.0   |  23  |  0.66    Ca    8.8      15 Feb 2023 06:29  Phos  2.4     -15  Mg     1.60     02-15    TPro  7.2  /  Alb  3.5  /  TBili  3.2<H>  /  DBili  x   /  AST  226<H>  /  ALT  248<H>  /  AlkPhos  281<H>  0215    PT/INR - ( 15 Feb 2023 06:29 )   PT: 13.0 sec;   INR: 1.12 ratio         PTT - ( 15 Feb 2023 06:29 )  PTT:25.8 sec  Urinalysis Basic - ( 2023 11:52 )    Color: Light Yellow / Appearance: Clear / S.042 / pH: x  Gluc: x / Ketone: Negative  / Bili: Negative / Urobili: <2 mg/dL   Blood: x / Protein: Negative / Nitrite: Negative   Leuk Esterase: Negative / RBC: x / WBC x   Sq Epi: x / Non Sq Epi: x / Bacteria: x      MEDICATIONS  (STANDING):  acetaminophen   IVPB .. 1000 milliGRAM(s) IV Intermittent every 6 hours  enoxaparin Injectable 40 milliGRAM(s) SubCutaneous every 24 hours  influenza   Vaccine 0.5 milliLiter(s) IntraMuscular once    MEDICATIONS  (PRN):  oxyCODONE    IR 5 milliGRAM(s) Oral every 4 hours PRN Moderate Pain (4 - 6)        Physical Exam:  General: well developed, well nourished, NAD  Respiratory: respirations non labored  Cardiac: regular rate & rhythm   Gastrointestinal: soft, nondistended, nontender, port sites intact  Extremities: FROM, warm, move spontaneously x4      Assessment/Plan: 28y Female s/p laparoscopic cholecystectomy, IOC, CBD exploration 2/15    - Diet: low fat diet  - Activity- OOB with assistance  - monitor vital signs  - Labs: am labs  - Pain medication as needed   - DVT ppx  - dispo planning    Team B [id number]

## 2023-02-16 ENCOUNTER — TRANSCRIPTION ENCOUNTER (OUTPATIENT)
Age: 28
End: 2023-02-16

## 2023-02-16 LAB
ALBUMIN SERPL ELPH-MCNC: 3.1 G/DL — LOW (ref 3.3–5)
ALBUMIN SERPL ELPH-MCNC: 3.3 G/DL — SIGNIFICANT CHANGE UP (ref 3.3–5)
ALP SERPL-CCNC: 202 U/L — HIGH (ref 40–120)
ALP SERPL-CCNC: 203 U/L — HIGH (ref 40–120)
ALT FLD-CCNC: 125 U/L — HIGH (ref 4–33)
ALT FLD-CCNC: 142 U/L — HIGH (ref 4–33)
ANION GAP SERPL CALC-SCNC: 10 MMOL/L — SIGNIFICANT CHANGE UP (ref 7–14)
ANION GAP SERPL CALC-SCNC: 8 MMOL/L — SIGNIFICANT CHANGE UP (ref 7–14)
AST SERPL-CCNC: 59 U/L — HIGH (ref 4–32)
AST SERPL-CCNC: 82 U/L — HIGH (ref 4–32)
BILIRUB DIRECT SERPL-MCNC: 3 MG/DL — HIGH (ref 0–0.3)
BILIRUB SERPL-MCNC: 2.9 MG/DL — HIGH (ref 0.2–1.2)
BILIRUB SERPL-MCNC: 3.4 MG/DL — HIGH (ref 0.2–1.2)
BUN SERPL-MCNC: 8 MG/DL — SIGNIFICANT CHANGE UP (ref 7–23)
BUN SERPL-MCNC: 8 MG/DL — SIGNIFICANT CHANGE UP (ref 7–23)
CALCIUM SERPL-MCNC: 8.7 MG/DL — SIGNIFICANT CHANGE UP (ref 8.4–10.5)
CALCIUM SERPL-MCNC: 8.9 MG/DL — SIGNIFICANT CHANGE UP (ref 8.4–10.5)
CHLORIDE SERPL-SCNC: 103 MMOL/L — SIGNIFICANT CHANGE UP (ref 98–107)
CHLORIDE SERPL-SCNC: 104 MMOL/L — SIGNIFICANT CHANGE UP (ref 98–107)
CO2 SERPL-SCNC: 24 MMOL/L — SIGNIFICANT CHANGE UP (ref 22–31)
CO2 SERPL-SCNC: 25 MMOL/L — SIGNIFICANT CHANGE UP (ref 22–31)
CREAT SERPL-MCNC: 0.65 MG/DL — SIGNIFICANT CHANGE UP (ref 0.5–1.3)
CREAT SERPL-MCNC: 0.66 MG/DL — SIGNIFICANT CHANGE UP (ref 0.5–1.3)
EGFR: 122 ML/MIN/1.73M2 — SIGNIFICANT CHANGE UP
EGFR: 122 ML/MIN/1.73M2 — SIGNIFICANT CHANGE UP
GLUCOSE SERPL-MCNC: 118 MG/DL — HIGH (ref 70–99)
GLUCOSE SERPL-MCNC: 137 MG/DL — HIGH (ref 70–99)
HCT VFR BLD CALC: 34.4 % — LOW (ref 34.5–45)
HGB BLD-MCNC: 10.4 G/DL — LOW (ref 11.5–15.5)
MAGNESIUM SERPL-MCNC: 2.1 MG/DL — SIGNIFICANT CHANGE UP (ref 1.6–2.6)
MCHC RBC-ENTMCNC: 24.8 PG — LOW (ref 27–34)
MCHC RBC-ENTMCNC: 30.2 GM/DL — LOW (ref 32–36)
MCV RBC AUTO: 82.1 FL — SIGNIFICANT CHANGE UP (ref 80–100)
NRBC # BLD: 0 /100 WBCS — SIGNIFICANT CHANGE UP (ref 0–0)
NRBC # FLD: 0 K/UL — SIGNIFICANT CHANGE UP (ref 0–0)
PHOSPHATE SERPL-MCNC: 1.5 MG/DL — LOW (ref 2.5–4.5)
PLATELET # BLD AUTO: 244 K/UL — SIGNIFICANT CHANGE UP (ref 150–400)
POTASSIUM SERPL-MCNC: 4.1 MMOL/L — SIGNIFICANT CHANGE UP (ref 3.5–5.3)
POTASSIUM SERPL-MCNC: 4.2 MMOL/L — SIGNIFICANT CHANGE UP (ref 3.5–5.3)
POTASSIUM SERPL-SCNC: 4.1 MMOL/L — SIGNIFICANT CHANGE UP (ref 3.5–5.3)
POTASSIUM SERPL-SCNC: 4.2 MMOL/L — SIGNIFICANT CHANGE UP (ref 3.5–5.3)
PROT SERPL-MCNC: 6.9 G/DL — SIGNIFICANT CHANGE UP (ref 6–8.3)
PROT SERPL-MCNC: 7.3 G/DL — SIGNIFICANT CHANGE UP (ref 6–8.3)
RBC # BLD: 4.19 M/UL — SIGNIFICANT CHANGE UP (ref 3.8–5.2)
RBC # FLD: 13.6 % — SIGNIFICANT CHANGE UP (ref 10.3–14.5)
SODIUM SERPL-SCNC: 137 MMOL/L — SIGNIFICANT CHANGE UP (ref 135–145)
SODIUM SERPL-SCNC: 137 MMOL/L — SIGNIFICANT CHANGE UP (ref 135–145)
WBC # BLD: 14.13 K/UL — HIGH (ref 3.8–10.5)
WBC # FLD AUTO: 14.13 K/UL — HIGH (ref 3.8–10.5)

## 2023-02-16 RX ORDER — OXYCODONE HYDROCHLORIDE 5 MG/1
2.5 TABLET ORAL EVERY 4 HOURS
Refills: 0 | Status: DISCONTINUED | OUTPATIENT
Start: 2023-02-16 | End: 2023-02-17

## 2023-02-16 RX ORDER — OXYCODONE HYDROCHLORIDE 5 MG/1
1 TABLET ORAL
Qty: 4 | Refills: 0
Start: 2023-02-16

## 2023-02-16 RX ORDER — SODIUM,POTASSIUM PHOSPHATES 278-250MG
2 POWDER IN PACKET (EA) ORAL ONCE
Refills: 0 | Status: COMPLETED | OUTPATIENT
Start: 2023-02-16 | End: 2023-02-16

## 2023-02-16 RX ORDER — OXYCODONE HYDROCHLORIDE 5 MG/1
5 TABLET ORAL EVERY 4 HOURS
Refills: 0 | Status: DISCONTINUED | OUTPATIENT
Start: 2023-02-16 | End: 2023-02-17

## 2023-02-16 RX ORDER — ACETAMINOPHEN 500 MG
650 TABLET ORAL EVERY 6 HOURS
Refills: 0 | Status: DISCONTINUED | OUTPATIENT
Start: 2023-02-16 | End: 2023-02-17

## 2023-02-16 RX ORDER — ACETAMINOPHEN 500 MG
2 TABLET ORAL
Qty: 0 | Refills: 0 | DISCHARGE
Start: 2023-02-16

## 2023-02-16 RX ADMIN — OXYCODONE HYDROCHLORIDE 5 MILLIGRAM(S): 5 TABLET ORAL at 18:31

## 2023-02-16 RX ADMIN — OXYCODONE HYDROCHLORIDE 5 MILLIGRAM(S): 5 TABLET ORAL at 10:07

## 2023-02-16 RX ADMIN — OXYCODONE HYDROCHLORIDE 5 MILLIGRAM(S): 5 TABLET ORAL at 19:30

## 2023-02-16 RX ADMIN — Medication 85 MILLIMOLE(S): at 11:57

## 2023-02-16 RX ADMIN — Medication 400 MILLIGRAM(S): at 00:29

## 2023-02-16 RX ADMIN — Medication 400 MILLIGRAM(S): at 07:10

## 2023-02-16 RX ADMIN — Medication 1000 MILLIGRAM(S): at 00:59

## 2023-02-16 RX ADMIN — ENOXAPARIN SODIUM 40 MILLIGRAM(S): 100 INJECTION SUBCUTANEOUS at 06:48

## 2023-02-16 RX ADMIN — Medication 650 MILLIGRAM(S): at 13:28

## 2023-02-16 RX ADMIN — Medication 650 MILLIGRAM(S): at 14:28

## 2023-02-16 RX ADMIN — Medication 1000 MILLIGRAM(S): at 07:45

## 2023-02-16 RX ADMIN — OXYCODONE HYDROCHLORIDE 5 MILLIGRAM(S): 5 TABLET ORAL at 11:07

## 2023-02-16 RX ADMIN — Medication 2 TABLET(S): at 09:48

## 2023-02-16 NOTE — PROGRESS NOTE ADULT - ASSESSMENT
28F old f with pmh of sleeve, pregnancy presenting with epigastric pain, and imaging showing thickened gallbladder with stone in gallbladder neck s/p lap zaida with CBD exploration, found to have stone that was removed on 2/15.    PLAN:    - regular diet  - f/u LFT  - possible discharge today, if LFT downtrending and pt pain well controlled  - DVT ppx: Lovenox    B Team Surgery [id number]

## 2023-02-16 NOTE — DISCHARGE NOTE PROVIDER - NSDCCPTREATMENT_GEN_ALL_CORE_FT
PRINCIPAL PROCEDURE  Procedure: Laparoscopic cholecystectomy  Findings and Treatment:       SECONDARY PROCEDURE  Procedure: Common bile duct exploration  Findings and Treatment:

## 2023-02-16 NOTE — DISCHARGE NOTE PROVIDER - CARE PROVIDER_API CALL
Jaskaran Perkins)  Surgery; Surgical Critical Care  [address], [address]  [place] [address]  Phone: [phone]  Fax: [phone]  Follow Up Time: 1 week

## 2023-02-16 NOTE — DISCHARGE NOTE PROVIDER - HOSPITAL COURSE
29 y/o female with past medical history of gastric sleeve 1 yr ago at [facility] with Dr. watson and pregnancy 3 months later presenting with 2 days of abdominal pain. Imaging performed in the ED consistent with acute cholecystitis and cholelithiasis in gallbladder neck.  The patient was admitted to the surgical service and underwent laparoscopic cholecystectomy and choledochoscope where debris was pushed into the duodenum. Completion cholangiogram showed no filling defect. The patient tolerated the procedure well. Post-operatively the patient was sent to the PACU. The patient was hemodynamically stable and was transferred to a surgical floor. Patient tolerated operation well and there were no post operative complications identified. The patient was advanced to a regular diet and tolerated it well. At the time of discharge, the patient was hemodynamically stable, was tolerating PO diet, was voiding urine and passing stool, was ambulating, and was comfortable with adequate pain control. The patient was instructed to follow up with Dr. Perkins within 1-2 weeks after discharge from the hospital. The patient/family felt comfortable with discharge. The patient had no other issues.

## 2023-02-16 NOTE — DISCHARGE NOTE PROVIDER - NSDCMRMEDTOKEN_GEN_ALL_CORE_FT
acetaminophen 325 mg oral tablet: 2 tab(s) orally every 6 hours  oxyCODONE 5 mg oral tablet: 1 tab(s) orally every 6 hours, As Needed -Severe Pain (7 - 10) MDD:4

## 2023-02-16 NOTE — DISCHARGE NOTE PROVIDER - NSDCCPCAREPLAN_GEN_ALL_CORE_FT
PRINCIPAL DISCHARGE DIAGNOSIS  Diagnosis: Acute cholecystitis  Assessment and Plan of Treatment: WOUND CARE:  Keep wound clean and dry. Do not scrub or rub incisions. Do not use lotion or powder on incisions.  BATHING: Please do not submerge wound underwater. You may shower and/or sponge bathe.  ACTIVITY: No heavy lifting or straining. Otherwise, you may return to your usual level of physical activity. If you are taking narcotic pain medication (such as Percocet) DO NOT drive a car, operate machinery or make important decisions.  DIET: Return to your usual diet.  NOTIFY YOUR SURGEON IF: You have any bleeding that does not stop, any pus draining from your wound(s), any fever (over 100.4 F) or chills, persistent nausea/vomiting, persistent diarrhea, or if your pain is not controlled on your discharge pain medications.  FOLLOW-UP: Please follow up with your primary care physician in one week regarding your hospitalization

## 2023-02-16 NOTE — PROGRESS NOTE ADULT - ATTENDING COMMENTS
Patient s/p lap zaida ioc  plan  discharge home  recovering well  downtrending lfts    I have personally interviewed and examined this patient, reviewed pertinent labs and imaging, and discussed the case with colleagues, residents, and physician assistants on B Team rounds.    The active care issues are:  1. gallstone pancreatitis    The Acute Care Surgery (B Team) Attending Group Practice:  Dr. Dez Shafer, Dr. Carloz Cameron, Dr. Jaskaran Perkins, Dr. Twan Valle,     urgent issues - spectra [phone]  nonurgent issues - [phone]  patient appointments or afterhours - [phone]

## 2023-02-17 ENCOUNTER — TRANSCRIPTION ENCOUNTER (OUTPATIENT)
Age: 28
End: 2023-02-17

## 2023-02-17 VITALS
OXYGEN SATURATION: 99 % | TEMPERATURE: 98 F | DIASTOLIC BLOOD PRESSURE: 63 MMHG | HEART RATE: 98 BPM | SYSTOLIC BLOOD PRESSURE: 108 MMHG | RESPIRATION RATE: 18 BRPM

## 2023-02-17 LAB
ALBUMIN SERPL ELPH-MCNC: 2.9 G/DL — LOW (ref 3.3–5)
ALP SERPL-CCNC: 194 U/L — HIGH (ref 40–120)
ALT FLD-CCNC: 92 U/L — HIGH (ref 4–33)
ANION GAP SERPL CALC-SCNC: 11 MMOL/L — SIGNIFICANT CHANGE UP (ref 7–14)
AST SERPL-CCNC: 36 U/L — HIGH (ref 4–32)
BILIRUB DIRECT SERPL-MCNC: 0.9 MG/DL — HIGH (ref 0–0.3)
BILIRUB INDIRECT FLD-MCNC: 0.4 MG/DL — SIGNIFICANT CHANGE UP (ref 0–1)
BILIRUB SERPL-MCNC: 1.3 MG/DL — HIGH (ref 0.2–1.2)
BUN SERPL-MCNC: 12 MG/DL — SIGNIFICANT CHANGE UP (ref 7–23)
CALCIUM SERPL-MCNC: 8.9 MG/DL — SIGNIFICANT CHANGE UP (ref 8.4–10.5)
CHLORIDE SERPL-SCNC: 105 MMOL/L — SIGNIFICANT CHANGE UP (ref 98–107)
CO2 SERPL-SCNC: 21 MMOL/L — LOW (ref 22–31)
CREAT SERPL-MCNC: 0.61 MG/DL — SIGNIFICANT CHANGE UP (ref 0.5–1.3)
EGFR: 124 ML/MIN/1.73M2 — SIGNIFICANT CHANGE UP
GLUCOSE SERPL-MCNC: 89 MG/DL — SIGNIFICANT CHANGE UP (ref 70–99)
HCT VFR BLD CALC: 33.5 % — LOW (ref 34.5–45)
HGB BLD-MCNC: 10.2 G/DL — LOW (ref 11.5–15.5)
MAGNESIUM SERPL-MCNC: 1.9 MG/DL — SIGNIFICANT CHANGE UP (ref 1.6–2.6)
MCHC RBC-ENTMCNC: 25.3 PG — LOW (ref 27–34)
MCHC RBC-ENTMCNC: 30.4 GM/DL — LOW (ref 32–36)
MCV RBC AUTO: 83.1 FL — SIGNIFICANT CHANGE UP (ref 80–100)
NRBC # BLD: 0 /100 WBCS — SIGNIFICANT CHANGE UP (ref 0–0)
NRBC # FLD: 0 K/UL — SIGNIFICANT CHANGE UP (ref 0–0)
PHOSPHATE SERPL-MCNC: 2.6 MG/DL — SIGNIFICANT CHANGE UP (ref 2.5–4.5)
PLATELET # BLD AUTO: 263 K/UL — SIGNIFICANT CHANGE UP (ref 150–400)
POTASSIUM SERPL-MCNC: 4.1 MMOL/L — SIGNIFICANT CHANGE UP (ref 3.5–5.3)
POTASSIUM SERPL-SCNC: 4.1 MMOL/L — SIGNIFICANT CHANGE UP (ref 3.5–5.3)
PROT SERPL-MCNC: 6.9 G/DL — SIGNIFICANT CHANGE UP (ref 6–8.3)
RBC # BLD: 4.03 M/UL — SIGNIFICANT CHANGE UP (ref 3.8–5.2)
RBC # FLD: 13.9 % — SIGNIFICANT CHANGE UP (ref 10.3–14.5)
SODIUM SERPL-SCNC: 137 MMOL/L — SIGNIFICANT CHANGE UP (ref 135–145)
WBC # BLD: 10.43 K/UL — SIGNIFICANT CHANGE UP (ref 3.8–10.5)
WBC # FLD AUTO: 10.43 K/UL — SIGNIFICANT CHANGE UP (ref 3.8–10.5)

## 2023-02-17 RX ADMIN — OXYCODONE HYDROCHLORIDE 5 MILLIGRAM(S): 5 TABLET ORAL at 13:13

## 2023-02-17 RX ADMIN — Medication 650 MILLIGRAM(S): at 06:50

## 2023-02-17 RX ADMIN — Medication 650 MILLIGRAM(S): at 06:20

## 2023-02-17 RX ADMIN — Medication 650 MILLIGRAM(S): at 01:03

## 2023-02-17 RX ADMIN — OXYCODONE HYDROCHLORIDE 5 MILLIGRAM(S): 5 TABLET ORAL at 12:43

## 2023-02-17 RX ADMIN — ENOXAPARIN SODIUM 40 MILLIGRAM(S): 100 INJECTION SUBCUTANEOUS at 06:19

## 2023-02-17 RX ADMIN — Medication 650 MILLIGRAM(S): at 00:33

## 2023-02-17 NOTE — DISCHARGE NOTE NURSING/CASE MANAGEMENT/SOCIAL WORK - NSDCPEFALRISK_GEN_ALL_CORE
For information on Fall & Injury Prevention, visit: [email or web address] OR  [email or web address] OR  https://www.cdc.gov/steadi/patient.html

## 2023-02-17 NOTE — DISCHARGE NOTE NURSING/CASE MANAGEMENT/SOCIAL WORK - NSDCPNINST_GEN_ALL_CORE
Follow up with Primary Care Physician. Monitor for signs of infection such as pain, swelling, temp greater than 100.4, Notify MD.

## 2023-02-17 NOTE — PROGRESS NOTE ADULT - ATTENDING COMMENTS
Patient with acute cholecystitis s/p lap zaida ioc  plan  d/c    I have personally interviewed and examined this patient, reviewed pertinent labs and imaging, and discussed the case with colleagues, residents, and physician assistants on B Team rounds.    The active care issues are:  1. s/p lap zaida ioc  The Acute Care Surgery (B Team) Attending Group Practice:  Dr. Dez Shafer, Dr. Carloz Cameron, Dr. Jaskaran Perkins, Dr. Twan Valle,     urgent issues - spectra [phone]  nonurgent issues - [phone]  patient appointments or afterhours - [phone]

## 2023-02-17 NOTE — PROGRESS NOTE ADULT - SUBJECTIVE AND OBJECTIVE BOX
Surgery Progress Note     24hour Events: s/p lap zaida w/ CBD exploration 2/15.     Subjective:  Patient seen and examined. Reports improvement in her abdominal pain.       Physical Exam:  General: NAD, Lying in bed comfortably  Respiratory: nonlabored respirations   GI/Abd: soft, appropriately tender generally throughout abdomen, nondistended. Incisions c/d/i w/ dermabond  Skin: Intact, no breakdown               OBJECTIVE  T(C): 36.5 (02-17-23 @ 05:30), Max: 37.5 (02-16-23 @ 22:17)  HR: 89 (02-17-23 @ 05:30) (84 - 101)  BP: 127/77 (02-17-23 @ 05:30) (112/68 - 127/77)  RR: 18 (02-17-23 @ 05:30) (16 - 18)  SpO2: 98% (02-17-23 @ 05:30) (97% - 99%)  I&O's Summary    16 Feb 2023 07:01  -  17 Feb 2023 07:00  --------------------------------------------------------  IN: 720 mL / OUT: 0 mL / NET: 720 mL      I&O's Detail    16 Feb 2023 07:01  -  17 Feb 2023 07:00  --------------------------------------------------------  IN:    Oral Fluid: 720 mL  Total IN: 720 mL    OUT:    IV PiggyBack: 0 mL    Voided (mL): 0 mL  Total OUT: 0 mL    Total NET: 720 mL        LABS                        10.4   14.13 )-----------( 244      ( 16 Feb 2023 07:28 )             34.4     02-16    137  |  104  |  8   ----------------------------<  118<H>  4.2   |  25  |  0.66    Ca    8.7      16 Feb 2023 15:15  Phos  1.5     02-16  Mg     2.10     02-16    TPro  7.3  /  Alb  3.3  /  TBili  2.9<H>  /  DBili  x   /  AST  59<H>  /  ALT  125<H>  /  AlkPhos  203<H>  02-16        ORDERS/MEDICATIONS  MEDICATIONS  (STANDING):  acetaminophen     Tablet .. 650 milliGRAM(s) Oral every 6 hours  enoxaparin Injectable 40 milliGRAM(s) SubCutaneous every 24 hours  influenza   Vaccine 0.5 milliLiter(s) IntraMuscular once    MEDICATIONS  (PRN):  oxyCODONE    IR 2.5 milliGRAM(s) Oral every 4 hours PRN Moderate Pain (4 - 6)  oxyCODONE    IR 5 milliGRAM(s) Oral every 4 hours PRN Severe Pain (7 - 10)    
Subjective:  No acute overnight events.   Patient seen and examined at bedside with surgical team during morning rounds.  Pain well controlled with pain medications.      PHYSICAL EXAM:   General: NAD, Lying in bed comfortably  Respiratory: nonlabored respirations   GI/Abd: soft, mildly tender generally throughout abdomen, nondistended  Skin: Intact, no breakdown    T(C): 38.3 (02-15-23 @ 09:41), Max: 38.3 (02-15-23 @ 09:41)  HR: 109 (02-15-23 @ 09:41) (70 - 109)  BP: 139/92 (02-15-23 @ 09:41) (118/71 - 146/64)  RR: 18 (02-15-23 @ 09:41) (16 - 18)  SpO2: 98% (02-15-23 @ 09:41) (98% - 100%)      02-14-23 @ 07:01  -  02-15-23 @ 07:00  --------------------------------------------------------  IN: 1100 mL / OUT: 1200 mL / NET: -100 mL    02-15-23 @ 07:01  -  02-15-23 @ 13:02  --------------------------------------------------------  IN: 300 mL / OUT: 1 mL / NET: 299 mL        LABS:  cret                        11.7   9.93  )-----------( 271      ( 15 Feb 2023 06:29 )             38.7     02-15    133<L>  |  101  |  6<L>  ----------------------------<  109<H>  4.0   |  23  |  0.66    Ca    8.8      15 Feb 2023 06:29  Phos  2.4     02-15  Mg     1.60     02-15    TPro  7.2  /  Alb  3.5  /  TBili  3.2<H>  /  DBili  x   /  AST  226<H>  /  ALT  248<H>  /  AlkPhos  281<H>  02-15    PT/INR - ( 15 Feb 2023 06:29 )   PT: 13.0 sec;   INR: 1.12 ratio         PTT - ( 15 Feb 2023 06:29 )  PTT:25.8 sec      acetaminophen   IVPB .. 1000 milliGRAM(s) IV Intermittent every 6 hours  enoxaparin Injectable 40 milliGRAM(s) SubCutaneous every 24 hours  HYDROmorphone  Injectable 0.25 milliGRAM(s) IV Push every 4 hours PRN  HYDROmorphone  Injectable 0.5 milliGRAM(s) IV Push every 4 hours PRN  influenza   Vaccine 0.5 milliLiter(s) IntraMuscular once  lactated ringers. 1000 milliLiter(s) IV Continuous <Continuous>  piperacillin/tazobactam IVPB.. 3.375 Gram(s) IV Intermittent every 8 hours      Imaging:
Surgery Progress Note     24hour Events: s/p lap zaida w/ CBD exploration    Subjective:  Patient seen and examined. Reports improvement in her abdominal pain. Pt has urinated.      Vital Signs:  Vital Signs Last 24 Hrs  T(C): 36.8 (16 Feb 2023 06:53), Max: 38.3 (15 Feb 2023 09:41)  T(F): 98.3 (16 Feb 2023 06:53), Max: 100.9 (15 Feb 2023 09:41)  HR: 98 (16 Feb 2023 06:53) (83 - 119)  BP: 133/88 (16 Feb 2023 06:53) (102/56 - 144/98)  BP(mean): 88 (15 Feb 2023 20:15) (65 - 95)  RR: 16 (16 Feb 2023 06:53) (14 - 26)  SpO2: 95% (16 Feb 2023 06:53) (93% - 98%)    Parameters below as of 16 Feb 2023 06:53  Patient On (Oxygen Delivery Method): room air        CAPILLARY BLOOD GLUCOSE          I&O's Detail    15 Feb 2023 07:01  -  16 Feb 2023 07:00  --------------------------------------------------------  IN:    IV PiggyBack: 100 mL    Lactated Ringers: 200 mL    Oral Fluid: 600 mL  Total IN: 900 mL    OUT:    Voided (mL): 1151 mL  Total OUT: 1151 mL    Total NET: -251 mL            Physical Exam:  General: NAD, Lying in bed comfortably  Respiratory: nonlabored respirations   GI/Abd: soft, appropriately tender generally throughout abdomen, nondistended. Incisions c/d/i w/ dermabond  Skin: Intact, no breakdown      Labs:    02-15    133<L>  |  101  |  6<L>  ----------------------------<  109<H>  4.0   |  23  |  0.66    Ca    8.8      15 Feb 2023 06:29  Phos  2.4     02-15  Mg     1.60     02-15    TPro  7.2  /  Alb  3.5  /  TBili  3.2<H>  /  DBili  x   /  AST  226<H>  /  ALT  248<H>  /  AlkPhos  281<H>  02-15    LIVER FUNCTIONS - ( 15 Feb 2023 06:29 )  Alb: 3.5 g/dL / Pro: 7.2 g/dL / ALK PHOS: 281 U/L / ALT: 248 U/L / AST: 226 U/L / GGT: x                                 10.4   14.13 )-----------( 244      ( 16 Feb 2023 07:28 )             34.4     PT/INR - ( 15 Feb 2023 06:29 )   PT: 13.0 sec;   INR: 1.12 ratio         PTT - ( 15 Feb 2023 06:29 )  PTT:25.8 sec

## 2023-02-17 NOTE — DISCHARGE NOTE NURSING/CASE MANAGEMENT/SOCIAL WORK - PATIENT PORTAL LINK FT
You can access the FollowMyHealth Patient Portal offered by [facility] by registering at the following website: [email or web address]. By joining [facility]’s FollowMyHealth portal, you will also be able to view your health information using other applications (apps) compatible with our system.

## 2023-02-17 NOTE — PROGRESS NOTE ADULT - ASSESSMENT
28F old f with pmh of sleeve, pregnancy presenting with epigastric pain, and imaging showing thickened gallbladder with stone in gallbladder neck s/p lap zaida with CBD exploration, found to have stone that was removed on 2/15. Bilirubin slightly uptrended POD1.     PLAN:    - regular diet  - f/u LFT  - possible discharge today, if LFT downtrending and pt pain well controlled  - DVT ppx: Lovenox    B Team Surgery [id number]

## 2023-03-02 LAB — SURGICAL PATHOLOGY STUDY: SIGNIFICANT CHANGE UP

## 2023-12-13 PROBLEM — Z78.9 OTHER SPECIFIED HEALTH STATUS: Chronic | Status: ACTIVE | Noted: 2023-02-14

## 2024-01-08 ENCOUNTER — RESULT REVIEW (OUTPATIENT)
Age: 29
End: 2024-01-08

## 2024-01-08 ENCOUNTER — APPOINTMENT (OUTPATIENT)
Dept: OBGYN | Facility: CLINIC | Age: 29
End: 2024-01-08
Payer: COMMERCIAL

## 2024-01-08 PROCEDURE — 99395 PREV VISIT EST AGE 18-39: CPT

## 2024-04-02 ENCOUNTER — APPOINTMENT (OUTPATIENT)
Dept: OBGYN | Facility: CLINIC | Age: 29
End: 2024-04-02

## 2024-05-14 ENCOUNTER — APPOINTMENT (OUTPATIENT)
Dept: OBGYN | Facility: CLINIC | Age: 29
End: 2024-05-14
Payer: COMMERCIAL

## 2024-05-14 PROCEDURE — 99213 OFFICE O/P EST LOW 20 MIN: CPT

## 2024-05-24 NOTE — ED PROVIDER NOTE - IV ALTEPLASE INCLUSION HIDDEN
show
CONSTITUTIONAL: No weakness, fevers or chills  EYES/ENT: No visual changes;  No vertigo or throat pain   NECK: No pain or stiffness  RESPIRATORY: No cough, wheezing, hemoptysis; No shortness of breath  CARDIOVASCULAR: No chest pain or palpitations  GASTROINTESTINAL: Per HPI   GENITOURINARY: No dysuria, frequency or hematuria  NEUROLOGICAL: No numbness or weakness  SKIN: No itching, rashes

## 2024-07-31 ENCOUNTER — APPOINTMENT (OUTPATIENT)
Dept: UROLOGY | Facility: CLINIC | Age: 29
End: 2024-07-31

## 2024-08-07 ENCOUNTER — APPOINTMENT (OUTPATIENT)
Dept: UROLOGY | Facility: CLINIC | Age: 29
End: 2024-08-07

## 2025-02-26 ENCOUNTER — APPOINTMENT (OUTPATIENT)
Dept: UROLOGY | Facility: CLINIC | Age: 30
End: 2025-02-26

## 2025-03-17 ENCOUNTER — NON-APPOINTMENT (OUTPATIENT)
Age: 30
End: 2025-03-17

## 2025-03-17 ENCOUNTER — APPOINTMENT (OUTPATIENT)
Dept: UROLOGY | Facility: CLINIC | Age: 30
End: 2025-03-17

## 2025-03-17 ENCOUNTER — APPOINTMENT (OUTPATIENT)
Dept: UROLOGY | Facility: CLINIC | Age: 30
End: 2025-03-17
Payer: COMMERCIAL

## 2025-03-17 VITALS — HEART RATE: 79 BPM | SYSTOLIC BLOOD PRESSURE: 117 MMHG | DIASTOLIC BLOOD PRESSURE: 76 MMHG

## 2025-03-17 DIAGNOSIS — R39.15 URGENCY OF URINATION: ICD-10-CM

## 2025-03-17 DIAGNOSIS — E66.01 MORBID (SEVERE) OBESITY DUE TO EXCESS CALORIES: ICD-10-CM

## 2025-03-17 DIAGNOSIS — Z87.440 PERSONAL HISTORY OF URINARY (TRACT) INFECTIONS: ICD-10-CM

## 2025-03-17 DIAGNOSIS — R39.9 UNSPECIFIED SYMPTOMS AND SIGNS INVOLVING THE GENITOURINARY SYSTEM: ICD-10-CM

## 2025-03-17 DIAGNOSIS — R15.2 FECAL URGENCY: ICD-10-CM

## 2025-03-17 DIAGNOSIS — N39.46 MIXED INCONTINENCE: ICD-10-CM

## 2025-03-17 DIAGNOSIS — R35.0 FREQUENCY OF MICTURITION: ICD-10-CM

## 2025-03-17 PROCEDURE — 99204 OFFICE O/P NEW MOD 45 MIN: CPT | Mod: 25

## 2025-03-17 PROCEDURE — 51798 US URINE CAPACITY MEASURE: CPT

## 2025-03-17 RX ORDER — DIAPER,BRIEF,INFANT-TODD,DISP
EACH MISCELLANEOUS
Refills: 0 | Status: ACTIVE | COMMUNITY

## 2025-03-17 RX ORDER — MULTIVIT WITH MIN/ALA/HERBS 50 MG
TABLET ORAL
Refills: 0 | Status: ACTIVE | COMMUNITY

## 2025-03-17 RX ORDER — IRON/IRON ASP GLY/FA/MV-MIN 38 125-25-1MG
TABLET ORAL
Refills: 0 | Status: ACTIVE | COMMUNITY

## 2025-03-17 RX ORDER — UBIDECARENONE/VIT E ACET 100MG-5
CAPSULE ORAL
Refills: 0 | Status: ACTIVE | COMMUNITY

## 2025-03-18 LAB
APPEARANCE: CLEAR
BACTERIA: ABNORMAL /HPF
BILIRUBIN URINE: NEGATIVE
BLOOD URINE: NEGATIVE
CAST: 1 /LPF
COLOR: YELLOW
EPITHELIAL CELLS: 14 /HPF
GLUCOSE QUALITATIVE U: NEGATIVE MG/DL
KETONES URINE: NEGATIVE MG/DL
LEUKOCYTE ESTERASE URINE: ABNORMAL
MICROSCOPIC-UA: NORMAL
NITRITE URINE: NEGATIVE
PH URINE: 7
PROTEIN URINE: NEGATIVE MG/DL
RED BLOOD CELLS URINE: 0 /HPF
SPECIFIC GRAVITY URINE: 1.02
UROBILINOGEN URINE: 0.2 MG/DL
WHITE BLOOD CELLS URINE: 4 /HPF

## 2025-03-19 ENCOUNTER — NON-APPOINTMENT (OUTPATIENT)
Age: 30
End: 2025-03-19

## 2025-03-19 LAB — BACTERIA UR CULT: NORMAL

## 2025-05-05 ENCOUNTER — NON-APPOINTMENT (OUTPATIENT)
Age: 30
End: 2025-05-05

## 2025-05-05 ENCOUNTER — APPOINTMENT (OUTPATIENT)
Dept: UROLOGY | Facility: CLINIC | Age: 30
End: 2025-05-05
Payer: COMMERCIAL

## 2025-05-05 DIAGNOSIS — R19.4 CHANGE IN BOWEL HABIT: ICD-10-CM

## 2025-05-05 DIAGNOSIS — R39.15 URGENCY OF URINATION: ICD-10-CM

## 2025-05-05 DIAGNOSIS — R35.0 FREQUENCY OF MICTURITION: ICD-10-CM

## 2025-05-05 DIAGNOSIS — Z87.898 PERSONAL HISTORY OF OTHER SPECIFIED CONDITIONS: ICD-10-CM

## 2025-05-05 DIAGNOSIS — N39.46 MIXED INCONTINENCE: ICD-10-CM

## 2025-05-05 PROCEDURE — 99214 OFFICE O/P EST MOD 30 MIN: CPT

## 2025-05-05 RX ORDER — MIRABEGRON 25 MG/1
25 TABLET, EXTENDED RELEASE ORAL
Qty: 30 | Refills: 3 | Status: ACTIVE | COMMUNITY
Start: 2025-05-05 | End: 1900-01-01

## 2025-06-09 ENCOUNTER — APPOINTMENT (OUTPATIENT)
Dept: UROLOGY | Facility: CLINIC | Age: 30
End: 2025-06-09
Payer: COMMERCIAL

## 2025-06-09 VITALS — DIASTOLIC BLOOD PRESSURE: 81 MMHG | HEART RATE: 69 BPM | SYSTOLIC BLOOD PRESSURE: 114 MMHG

## 2025-06-09 PROBLEM — N39.41 URGE INCONTINENCE OF URINE: Status: ACTIVE | Noted: 2025-06-09

## 2025-06-09 PROCEDURE — 99213 OFFICE O/P EST LOW 20 MIN: CPT

## 2025-07-14 ENCOUNTER — APPOINTMENT (OUTPATIENT)
Dept: UROLOGY | Facility: CLINIC | Age: 30
End: 2025-07-14
Payer: COMMERCIAL

## 2025-07-14 ENCOUNTER — OUTPATIENT (OUTPATIENT)
Dept: OUTPATIENT SERVICES | Facility: HOSPITAL | Age: 30
LOS: 1 days | End: 2025-07-14
Payer: COMMERCIAL

## 2025-07-14 VITALS — DIASTOLIC BLOOD PRESSURE: 76 MMHG | SYSTOLIC BLOOD PRESSURE: 109 MMHG | RESPIRATION RATE: 17 BRPM | HEART RATE: 74 BPM

## 2025-07-14 DIAGNOSIS — R35.0 FREQUENCY OF MICTURITION: ICD-10-CM

## 2025-07-14 DIAGNOSIS — Z98.84 BARIATRIC SURGERY STATUS: Chronic | ICD-10-CM

## 2025-07-14 PROCEDURE — 52000 CYSTOURETHROSCOPY: CPT

## 2025-07-14 PROCEDURE — 99212 OFFICE O/P EST SF 10 MIN: CPT | Mod: 25

## 2025-07-23 DIAGNOSIS — R39.15 URGENCY OF URINATION: ICD-10-CM

## 2025-07-23 DIAGNOSIS — Z87.440 PERSONAL HISTORY OF URINARY (TRACT) INFECTIONS: ICD-10-CM

## (undated) DEVICE — ELCTR BOVIE TIP BLADE INSULATED 2.75" EDGE

## (undated) DEVICE — SUT VICRYL 0 27" UR-6

## (undated) DEVICE — VENODYNE/SCD SLEEVE CALF MEDIUM

## (undated) DEVICE — PROTECTOR HEEL / ELBOW

## (undated) DEVICE — WARMING BLANKET UPPER ADULT

## (undated) DEVICE — BLADE SURGICAL #15 CARBON

## (undated) DEVICE — TUBING OLYMPUS INSUFFLATION

## (undated) DEVICE — INFLATOR ENCORE 26

## (undated) DEVICE — TROCAR COVIDIEN VERSAONE FIXATION CANNULA 5MM

## (undated) DEVICE — D HELP - CLEARVIEW CLEARIFY SYSTEM

## (undated) DEVICE — SYR LUER LOK 20CC

## (undated) DEVICE — PACK GENERAL LAPAROSCOPY

## (undated) DEVICE — BASIN SET SINGLE

## (undated) DEVICE — GLV 7.5 PROTEXIS (BLUE)

## (undated) DEVICE — CANISTER DISPOSABLE THIN WALL 3000CC

## (undated) DEVICE — DRSG STERISTRIPS 0.5 X 4"

## (undated) DEVICE — SOL IRR POUR NS 0.9% 1000ML

## (undated) DEVICE — GLV 7 PROTEXIS (WHITE)

## (undated) DEVICE — TROCAR COVIDIEN BLUNT TIP HASSAN 12MM STANDARD

## (undated) DEVICE — TIP METZENBAUM SCISSOR MONOPOLAR ENDOCUT (ORANGE)

## (undated) DEVICE — SUT MONOCRYL 4-0 27" PS-2 UNDYED

## (undated) DEVICE — DRAPE TOWEL BLUE 17" X 24"

## (undated) DEVICE — TROCAR COVIDIEN VERSAPORT BLADELESS OPTICAL 5MM STANDARD

## (undated) DEVICE — ELCTR GROUNDING PAD ADULT COVIDIEN

## (undated) DEVICE — DRAPE 3/4 SHEET 52X76"

## (undated) DEVICE — POSITIONER STRAP ARMBOARD VELCRO TS-30

## (undated) DEVICE — DISSECTOR ENDOSCOPIC KITTNER SINGLE TIP

## (undated) DEVICE — SOL IRR POUR H2O 500ML